# Patient Record
Sex: MALE | Race: WHITE | NOT HISPANIC OR LATINO | Employment: FULL TIME | URBAN - METROPOLITAN AREA
[De-identification: names, ages, dates, MRNs, and addresses within clinical notes are randomized per-mention and may not be internally consistent; named-entity substitution may affect disease eponyms.]

---

## 2017-03-13 ENCOUNTER — OFFICE VISIT (OUTPATIENT)
Dept: URGENT CARE | Facility: PHYSICIAN GROUP | Age: 42
End: 2017-03-13
Payer: COMMERCIAL

## 2017-03-13 VITALS
TEMPERATURE: 98 F | HEIGHT: 70 IN | SYSTOLIC BLOOD PRESSURE: 122 MMHG | WEIGHT: 205 LBS | OXYGEN SATURATION: 98 % | HEART RATE: 92 BPM | DIASTOLIC BLOOD PRESSURE: 84 MMHG | BODY MASS INDEX: 29.35 KG/M2

## 2017-03-13 DIAGNOSIS — G44.009 CLUSTER HEADACHE, NOT INTRACTABLE, UNSPECIFIED CHRONICITY PATTERN: ICD-10-CM

## 2017-03-13 PROCEDURE — 99203 OFFICE O/P NEW LOW 30 MIN: CPT | Performed by: NURSE PRACTITIONER

## 2017-03-13 RX ORDER — CEFDINIR 300 MG/1
CAPSULE ORAL
Refills: 0 | COMMUNITY
Start: 2017-02-28 | End: 2019-12-13

## 2017-03-13 RX ORDER — HYDROCODONE BITARTRATE AND ACETAMINOPHEN 5; 325 MG/1; MG/1
TABLET ORAL
Refills: 0 | COMMUNITY
Start: 2017-03-10 | End: 2019-12-13

## 2017-03-13 RX ORDER — ZOLMITRIPTAN 5 MG/1
1 SPRAY NASAL
Qty: 1 EACH | Refills: 3 | Status: SHIPPED | OUTPATIENT
Start: 2017-03-13 | End: 2019-12-13

## 2017-03-13 RX ORDER — CEFUROXIME AXETIL 500 MG/1
TABLET ORAL
Refills: 0 | COMMUNITY
Start: 2017-03-09 | End: 2019-12-13

## 2017-03-13 ASSESSMENT — ENCOUNTER SYMPTOMS
FEVER: 0
HEADACHES: 1
CHILLS: 0
VOMITING: 0
MYALGIAS: 0
SHORTNESS OF BREATH: 0
NAUSEA: 0

## 2017-03-13 NOTE — PATIENT INSTRUCTIONS
"Cluster Headache  Cluster headaches are recognized by their pattern of deep, intense head pain. They normally occur on one side of your head, but they may \"switch sides\" in subsequent episodes. Typically, cluster headaches:   · Are severe in nature.    · Occur repeatedly over weeks to months and are followed by periods of no headaches.    · Can last from 15 minutes to 3 hours.    · Occur at the same time each day, often at night.    · Occur several times a day.  CAUSES  The exact cause of cluster headaches is not known. Alcohol use may be associated with cluster headaches.  SIGNS AND SYMPTOMS   · Severe pain that begins in or around your eye or temple.    · One-sided head pain.    · Feeling sick to your stomach (nauseous).    · Sensitivity to light.    · Runny nose.    · Eye redness, tearing, and nasal stuffiness on the side of your head where you are experiencing pain.    · Sweaty, pale skin of the face.    · Droopy or swollen eyelid.    · Restlessness.  DIAGNOSIS   Cluster headaches are diagnosed based on symptoms and a physical exam. Your health care provider may order a CT scan or an MRI of your head or lab tests to see if your headaches are caused by other medical conditions.   TREATMENT   · Medicines for pain relief and to prevent recurrent attacks. Some people may need a combination of medicines.  · Oxygen for pain relief.    · Biofeedback programs to help reduce headache pain.    It may be helpful to keep a headache diary. This may help you find a trend for what is triggering your headaches. Your health care provider can develop a treatment plan.   HOME CARE INSTRUCTIONS   During cluster periods:   · Follow a regular sleep schedule. Do not vary the amount and time that you sleep from day to day. It is important to stay on the same schedule during a cluster period to help prevent headaches.    · Avoid alcohol.    · Stop smoking if you smoke.    SEEK MEDICAL CARE IF:  · You have any changes from your previous " cluster headaches either in intensity or frequency.    · You are not getting relief from medicines you are taking.    SEEK IMMEDIATE MEDICAL CARE IF:   · You faint.    · You have weakness or numbness, especially on one side of your body or face.    · You have double vision.    · You have nausea or vomiting that is not relieved within several hours.    · You cannot keep your balance or have difficulty talking or walking.    · You have neck pain or stiffness.    · You have a fever.  MAKE SURE YOU:  · Understand these instructions.    · Will watch your condition.    · Will get help right away if you are not doing well or get worse.     This information is not intended to replace advice given to you by your health care provider. Make sure you discuss any questions you have with your health care provider.     Document Released: 12/18/2006 Document Revised: 10/08/2014 Document Reviewed: 07/10/2014  [employer] Interactive Patient Education ©2016 [employer]

## 2017-03-13 NOTE — PROGRESS NOTES
"Subjective:      Kedar Michele is a 41 y.o. male who presents with Migraine  Surgical HX reviewed in [facility] and not pertinent to today's visit  Past Medical History   Diagnosis Date   • Brain hemangioma (CMS-HCC)      Current medications reviewed.  Social History   Substance Use Topics   • Smoking status: Never Smoker    • Smokeless tobacco: Not on file   • Alcohol Use: Not on file               HPI  Chief Complaint   Patient presents with   • Migraine     migraine x 2 months usually at night or morning   In December he was boxing and felt like he has had head aches since. Left sided in nature and throbbing and feels like his eye squeezing. Seems worse at night. He had to call in sick this am. Tried advil, excedrin, iburprofen, norco, cefdinr, sumatriptan, and fiorcet/fiornal. States the pain is worsening. This am he did have some blurry vision which was new. The pain is sudden and then resolves just as suddenly. No other aggravating or alleviating factors.     Review of Systems   Constitutional: Negative for fever and chills.   Respiratory: Negative for shortness of breath.    Gastrointestinal: Negative for nausea and vomiting.   Musculoskeletal: Negative for myalgias.   Neurological: Positive for headaches.   All other systems reviewed and are negative.         Objective:     /84 mmHg  Pulse 92  Temp(Src) 36.7 °C (98 °F)  Ht 1.778 m (5' 10\")  Wt 92.987 kg (205 lb)  BMI 29.41 kg/m2  SpO2 98%     Physical Exam   Constitutional: He is oriented to person, place, and time. He appears well-developed and well-nourished. No distress.   HENT:   Head: Normocephalic and atraumatic.   Right Ear: External ear normal.   Left Ear: External ear normal.   Eyes: Conjunctivae are normal.   Neck: Normal range of motion.   Pulmonary/Chest: Effort normal. No respiratory distress.   Neurological: He is alert and oriented to person, place, and time.   Skin: Skin is warm and dry.   Psychiatric: He has a normal mood and " affect. His behavior is normal. Judgment and thought content normal.     **Addendum** - zomig is too expensive - changed to sumatriptan intranasally           Assessment/Plan:     1. Cluster headache, not intractable, unspecified chronicity pattern  zolmitriptan (ZOMIG) 5 MG nasal solution     Keeping MRI appt for next week  Discussed causes  Will trial Zomig nasally  Fluids  Strict ER precautions discussed  Handout given    Please make an appointment for follow up with your primary care provider. Return for any change in condition, worsening of symptoms, or any other concerns. Please go to the nearest emergency room for any shortness of breath or chest pain or for any of the emergent conditions we have discussed. Patient states understanding to plan of care and discharge instructions.    Please note that this dictation was created using voice recognition software. I have worked with consultants from the vendor as well as technical experts from [facility] [employer] to optimize the interface. I have made every reasonable attempt to correct obvious errors, but I expect that there are errors of grammar and possibly content that I did not discover before finalizing the note.

## 2017-03-16 ENCOUNTER — PATIENT MESSAGE (OUTPATIENT)
Dept: URGENT CARE | Facility: PHYSICIAN GROUP | Age: 42
End: 2017-03-16

## 2017-03-16 ENCOUNTER — TELEPHONE (OUTPATIENT)
Dept: HOSPITALIST | Facility: MEDICAL CENTER | Age: 42
End: 2017-03-16

## 2017-03-16 NOTE — PROGRESS NOTES
I would be happy to supply a note for work with regards to tactical training until you follow up with your primary care provider. The note will be in the letters section of your chart.   However, I am unable to prescribed pain medication after our visit is complete as I am not a primary care provider. I would recommend calling Dr. Pineda since he has been treating you for this for the past few months.    Respectfully,   Lissa ARREDONDO

## 2017-03-16 NOTE — Clinical Note
March 16, 2017         Patient: Kedar Michele   YOB: 1975   Date of Visit: 3/16/2017           To Whom it May Concern:    Kedar Michele was seen in my clinic on 3/14/2017. 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    If you have any questions or concerns, please don't hesitate to call.        Sincerely,           JEROME Salgado.P.VIK.  Electronically Signed

## 2017-03-16 NOTE — TELEPHONE ENCOUNTER
Lissa Patterson, Pt's wife called this morning at appx 1150am to inquire about stronger pain meds for headaches and a note to excuse from Defensive Tactics class. She asked to have you return call at [phone]. Thank you!    - Mk

## 2017-03-20 ENCOUNTER — HOSPITAL ENCOUNTER (OUTPATIENT)
Dept: RADIOLOGY | Facility: MEDICAL CENTER | Age: 42
End: 2017-03-20
Attending: FAMILY MEDICINE
Payer: COMMERCIAL

## 2017-03-20 DIAGNOSIS — D18.02 BRAIN HEMANGIOMA (HCC): ICD-10-CM

## 2017-03-20 PROCEDURE — 70553 MRI BRAIN STEM W/O & W/DYE: CPT

## 2018-01-31 ENCOUNTER — HOSPITAL ENCOUNTER (OUTPATIENT)
Dept: RADIOLOGY | Facility: MEDICAL CENTER | Age: 43
End: 2018-01-31
Attending: PHYSICIAN ASSISTANT
Payer: COMMERCIAL

## 2018-01-31 DIAGNOSIS — M25.512 PAIN IN JOINT OF LEFT SHOULDER: ICD-10-CM

## 2018-01-31 PROCEDURE — 73030 X-RAY EXAM OF SHOULDER: CPT | Mod: LT

## 2018-04-21 ENCOUNTER — HOSPITAL ENCOUNTER (OUTPATIENT)
Dept: LAB | Facility: MEDICAL CENTER | Age: 43
End: 2018-04-21
Attending: PHYSICIAN ASSISTANT
Payer: COMMERCIAL

## 2018-04-21 LAB
25(OH)D3 SERPL-MCNC: 20 NG/ML (ref 30–100)
ALBUMIN SERPL BCP-MCNC: 5.1 G/DL (ref 3.2–4.9)
ALBUMIN/GLOB SERPL: 2 G/DL
ALP SERPL-CCNC: 99 U/L (ref 30–99)
ALT SERPL-CCNC: 22 U/L (ref 2–50)
ANION GAP SERPL CALC-SCNC: 10 MMOL/L (ref 0–11.9)
APPEARANCE UR: CLEAR
AST SERPL-CCNC: 20 U/L (ref 12–45)
BASOPHILS # BLD AUTO: 0.3 % (ref 0–1.8)
BASOPHILS # BLD: 0.03 K/UL (ref 0–0.12)
BILIRUB SERPL-MCNC: 1.1 MG/DL (ref 0.1–1.5)
BILIRUB UR QL STRIP.AUTO: NEGATIVE
BUN SERPL-MCNC: 18 MG/DL (ref 8–22)
CALCIUM SERPL-MCNC: 10.1 MG/DL (ref 8.5–10.5)
CHLORIDE SERPL-SCNC: 105 MMOL/L (ref 96–112)
CHOLEST SERPL-MCNC: 231 MG/DL (ref 100–199)
CO2 SERPL-SCNC: 24 MMOL/L (ref 20–33)
COLOR UR: YELLOW
CREAT SERPL-MCNC: 1.06 MG/DL (ref 0.5–1.4)
CRP SERPL HS-MCNC: 2.9 MG/L (ref 0–7.5)
EOSINOPHIL # BLD AUTO: 0.06 K/UL (ref 0–0.51)
EOSINOPHIL NFR BLD: 0.6 % (ref 0–6.9)
ERYTHROCYTE [DISTWIDTH] IN BLOOD BY AUTOMATED COUNT: 40.3 FL (ref 35.9–50)
GLOBULIN SER CALC-MCNC: 2.5 G/DL (ref 1.9–3.5)
GLUCOSE SERPL-MCNC: 91 MG/DL (ref 65–99)
GLUCOSE UR STRIP.AUTO-MCNC: NEGATIVE MG/DL
HCT VFR BLD AUTO: 48.9 % (ref 42–52)
HDLC SERPL-MCNC: 56 MG/DL
HGB BLD-MCNC: 17.2 G/DL (ref 14–18)
IMM GRANULOCYTES # BLD AUTO: 0.04 K/UL (ref 0–0.11)
IMM GRANULOCYTES NFR BLD AUTO: 0.4 % (ref 0–0.9)
KETONES UR STRIP.AUTO-MCNC: NEGATIVE MG/DL
LDLC SERPL CALC-MCNC: 162 MG/DL
LEUKOCYTE ESTERASE UR QL STRIP.AUTO: NEGATIVE
LYMPHOCYTES # BLD AUTO: 2.57 K/UL (ref 1–4.8)
LYMPHOCYTES NFR BLD: 27.2 % (ref 22–41)
MCH RBC QN AUTO: 29.9 PG (ref 27–33)
MCHC RBC AUTO-ENTMCNC: 35.2 G/DL (ref 33.7–35.3)
MCV RBC AUTO: 85 FL (ref 81.4–97.8)
MICRO URNS: NORMAL
MONOCYTES # BLD AUTO: 0.51 K/UL (ref 0–0.85)
MONOCYTES NFR BLD AUTO: 5.4 % (ref 0–13.4)
NEUTROPHILS # BLD AUTO: 6.25 K/UL (ref 1.82–7.42)
NEUTROPHILS NFR BLD: 66.1 % (ref 44–72)
NITRITE UR QL STRIP.AUTO: NEGATIVE
NRBC # BLD AUTO: 0 K/UL
NRBC BLD-RTO: 0 /100 WBC
PH UR STRIP.AUTO: 5.5 [PH]
PLATELET # BLD AUTO: 292 K/UL (ref 164–446)
PMV BLD AUTO: 10.2 FL (ref 9–12.9)
POTASSIUM SERPL-SCNC: 4.4 MMOL/L (ref 3.6–5.5)
PROT SERPL-MCNC: 7.6 G/DL (ref 6–8.2)
PROT UR QL STRIP: NEGATIVE MG/DL
PSA SERPL-MCNC: 0.74 NG/ML (ref 0–4)
RBC # BLD AUTO: 5.75 M/UL (ref 4.7–6.1)
RBC UR QL AUTO: NEGATIVE
SODIUM SERPL-SCNC: 139 MMOL/L (ref 135–145)
SP GR UR STRIP.AUTO: 1.03
T3FREE SERPL-MCNC: 3.32 PG/ML (ref 2.4–4.2)
T4 FREE SERPL-MCNC: 0.97 NG/DL (ref 0.53–1.43)
TRIGL SERPL-MCNC: 64 MG/DL (ref 0–149)
TSH SERPL DL<=0.005 MIU/L-ACNC: 0.99 UIU/ML (ref 0.38–5.33)
UROBILINOGEN UR STRIP.AUTO-MCNC: 0.2 MG/DL
WBC # BLD AUTO: 9.5 K/UL (ref 4.8–10.8)

## 2018-04-21 PROCEDURE — 80053 COMPREHEN METABOLIC PANEL: CPT

## 2018-04-21 PROCEDURE — 84153 ASSAY OF PSA TOTAL: CPT

## 2018-04-21 PROCEDURE — 85025 COMPLETE CBC W/AUTO DIFF WBC: CPT

## 2018-04-21 PROCEDURE — 86141 C-REACTIVE PROTEIN HS: CPT

## 2018-04-21 PROCEDURE — 82306 VITAMIN D 25 HYDROXY: CPT

## 2018-04-21 PROCEDURE — 84481 FREE ASSAY (FT-3): CPT

## 2018-04-21 PROCEDURE — 84439 ASSAY OF FREE THYROXINE: CPT

## 2018-04-21 PROCEDURE — 84270 ASSAY OF SEX HORMONE GLOBUL: CPT

## 2018-04-21 PROCEDURE — 81003 URINALYSIS AUTO W/O SCOPE: CPT

## 2018-04-21 PROCEDURE — 84443 ASSAY THYROID STIM HORMONE: CPT

## 2018-04-21 PROCEDURE — 36415 COLL VENOUS BLD VENIPUNCTURE: CPT

## 2018-04-21 PROCEDURE — 84403 ASSAY OF TOTAL TESTOSTERONE: CPT

## 2018-04-21 PROCEDURE — 80061 LIPID PANEL: CPT

## 2018-04-24 LAB
SHBG SERPL-SCNC: 45 NMOL/L (ref 11–80)
TESTOST FREE MFR SERPL: 1.5 % (ref 1.6–2.9)
TESTOST FREE SERPL-MCNC: 44 PG/ML (ref 47–244)
TESTOST SERPL-MCNC: 297 NG/DL (ref 300–890)

## 2019-10-08 ENCOUNTER — HOSPITAL ENCOUNTER (OUTPATIENT)
Dept: LAB | Facility: MEDICAL CENTER | Age: 44
End: 2019-10-08
Attending: PHYSICIAN ASSISTANT
Payer: COMMERCIAL

## 2019-10-08 LAB
25(OH)D3 SERPL-MCNC: 25 NG/ML (ref 30–100)
ALBUMIN SERPL BCP-MCNC: 5 G/DL (ref 3.2–4.9)
ALBUMIN/GLOB SERPL: 2.6 G/DL
ALP SERPL-CCNC: 68 U/L (ref 30–99)
ALT SERPL-CCNC: 18 U/L (ref 2–50)
ANION GAP SERPL CALC-SCNC: 8 MMOL/L (ref 0–11.9)
AST SERPL-CCNC: 16 U/L (ref 12–45)
BASOPHILS # BLD AUTO: 0.3 % (ref 0–1.8)
BASOPHILS # BLD: 0.02 K/UL (ref 0–0.12)
BILIRUB SERPL-MCNC: 1.2 MG/DL (ref 0.1–1.5)
BUN SERPL-MCNC: 19 MG/DL (ref 8–22)
CALCIUM SERPL-MCNC: 9.8 MG/DL (ref 8.5–10.5)
CHLORIDE SERPL-SCNC: 104 MMOL/L (ref 96–112)
CHOLEST SERPL-MCNC: 175 MG/DL (ref 100–199)
CO2 SERPL-SCNC: 26 MMOL/L (ref 20–33)
CREAT SERPL-MCNC: 0.98 MG/DL (ref 0.5–1.4)
EOSINOPHIL # BLD AUTO: 0.08 K/UL (ref 0–0.51)
EOSINOPHIL NFR BLD: 1.2 % (ref 0–6.9)
ERYTHROCYTE [DISTWIDTH] IN BLOOD BY AUTOMATED COUNT: 40 FL (ref 35.9–50)
EST. AVERAGE GLUCOSE BLD GHB EST-MCNC: 94 MG/DL
FASTING STATUS PATIENT QL REPORTED: NORMAL
GLOBULIN SER CALC-MCNC: 1.9 G/DL (ref 1.9–3.5)
GLUCOSE SERPL-MCNC: 79 MG/DL (ref 65–99)
HBA1C MFR BLD: 4.9 % (ref 0–5.6)
HCT VFR BLD AUTO: 49 % (ref 42–52)
HDLC SERPL-MCNC: 50 MG/DL
HGB BLD-MCNC: 16.6 G/DL (ref 14–18)
IMM GRANULOCYTES # BLD AUTO: 0.02 K/UL (ref 0–0.11)
IMM GRANULOCYTES NFR BLD AUTO: 0.3 % (ref 0–0.9)
LDLC SERPL CALC-MCNC: 106 MG/DL
LYMPHOCYTES # BLD AUTO: 2.49 K/UL (ref 1–4.8)
LYMPHOCYTES NFR BLD: 36.1 % (ref 22–41)
MCH RBC QN AUTO: 29.1 PG (ref 27–33)
MCHC RBC AUTO-ENTMCNC: 33.9 G/DL (ref 33.7–35.3)
MCV RBC AUTO: 85.8 FL (ref 81.4–97.8)
MONOCYTES # BLD AUTO: 0.49 K/UL (ref 0–0.85)
MONOCYTES NFR BLD AUTO: 7.1 % (ref 0–13.4)
NEUTROPHILS # BLD AUTO: 3.8 K/UL (ref 1.82–7.42)
NEUTROPHILS NFR BLD: 55 % (ref 44–72)
NRBC # BLD AUTO: 0 K/UL
NRBC BLD-RTO: 0 /100 WBC
PLATELET # BLD AUTO: 266 K/UL (ref 164–446)
PMV BLD AUTO: 10.6 FL (ref 9–12.9)
POTASSIUM SERPL-SCNC: 4.1 MMOL/L (ref 3.6–5.5)
PROT SERPL-MCNC: 6.9 G/DL (ref 6–8.2)
PSA SERPL-MCNC: 0.82 NG/ML (ref 0–4)
RBC # BLD AUTO: 5.71 M/UL (ref 4.7–6.1)
SODIUM SERPL-SCNC: 138 MMOL/L (ref 135–145)
T3FREE SERPL-MCNC: 3.73 PG/ML (ref 2.4–4.2)
T4 FREE SERPL-MCNC: 1.05 NG/DL (ref 0.53–1.43)
TRIGL SERPL-MCNC: 96 MG/DL (ref 0–149)
TSH SERPL DL<=0.005 MIU/L-ACNC: 0.81 UIU/ML (ref 0.38–5.33)
WBC # BLD AUTO: 6.9 K/UL (ref 4.8–10.8)

## 2019-10-08 PROCEDURE — 80053 COMPREHEN METABOLIC PANEL: CPT

## 2019-10-08 PROCEDURE — 84403 ASSAY OF TOTAL TESTOSTERONE: CPT

## 2019-10-08 PROCEDURE — 83036 HEMOGLOBIN GLYCOSYLATED A1C: CPT

## 2019-10-08 PROCEDURE — 84439 ASSAY OF FREE THYROXINE: CPT

## 2019-10-08 PROCEDURE — 84153 ASSAY OF PSA TOTAL: CPT

## 2019-10-08 PROCEDURE — 84443 ASSAY THYROID STIM HORMONE: CPT

## 2019-10-08 PROCEDURE — 80061 LIPID PANEL: CPT

## 2019-10-08 PROCEDURE — 84270 ASSAY OF SEX HORMONE GLOBUL: CPT

## 2019-10-08 PROCEDURE — 82306 VITAMIN D 25 HYDROXY: CPT

## 2019-10-08 PROCEDURE — 36415 COLL VENOUS BLD VENIPUNCTURE: CPT

## 2019-10-08 PROCEDURE — 84481 FREE ASSAY (FT-3): CPT

## 2019-10-08 PROCEDURE — 85025 COMPLETE CBC W/AUTO DIFF WBC: CPT

## 2019-10-09 LAB
SHBG SERPL-SCNC: 43 NMOL/L (ref 11–80)
TESTOST FREE MFR SERPL: 1.6 % (ref 1.6–2.9)
TESTOST FREE SERPL-MCNC: 85 PG/ML (ref 47–244)
TESTOST SERPL-MCNC: 519 NG/DL (ref 300–890)

## 2019-10-11 ENCOUNTER — HOSPITAL ENCOUNTER (OUTPATIENT)
Dept: RADIOLOGY | Facility: MEDICAL CENTER | Age: 44
End: 2019-10-11
Attending: PHYSICIAN ASSISTANT
Payer: COMMERCIAL

## 2019-10-11 DIAGNOSIS — G89.29 CHRONIC LOW BACK PAIN WITHOUT SCIATICA, UNSPECIFIED BACK PAIN LATERALITY: ICD-10-CM

## 2019-10-11 DIAGNOSIS — M54.50 CHRONIC LOW BACK PAIN WITHOUT SCIATICA, UNSPECIFIED BACK PAIN LATERALITY: ICD-10-CM

## 2019-10-11 PROCEDURE — 72072 X-RAY EXAM THORAC SPINE 3VWS: CPT

## 2019-12-12 ENCOUNTER — TELEPHONE (OUTPATIENT)
Dept: SCHEDULING | Facility: IMAGING CENTER | Age: 44
End: 2019-12-12

## 2019-12-13 ENCOUNTER — OFFICE VISIT (OUTPATIENT)
Dept: MEDICAL GROUP | Facility: PHYSICIAN GROUP | Age: 44
End: 2019-12-13
Payer: COMMERCIAL

## 2019-12-13 VITALS
RESPIRATION RATE: 16 BRPM | WEIGHT: 190 LBS | TEMPERATURE: 97.3 F | HEIGHT: 70 IN | OXYGEN SATURATION: 97 % | HEART RATE: 70 BPM | BODY MASS INDEX: 27.2 KG/M2 | DIASTOLIC BLOOD PRESSURE: 70 MMHG | SYSTOLIC BLOOD PRESSURE: 120 MMHG

## 2019-12-13 DIAGNOSIS — M54.50 ACUTE RIGHT-SIDED LOW BACK PAIN WITHOUT SCIATICA: ICD-10-CM

## 2019-12-13 PROBLEM — M54.9 BACK PAIN: Status: ACTIVE | Noted: 2019-12-13

## 2019-12-13 PROCEDURE — 99204 OFFICE O/P NEW MOD 45 MIN: CPT | Performed by: FAMILY MEDICINE

## 2019-12-13 NOTE — PROGRESS NOTES
Subjective:     CC:  The encounter diagnosis was Acute right-sided low back pain without sciatica.    HISTORY OF THE PRESENT ILLNESS: Patient is a 44 y.o. male. This pleasant patient is here today to establish care and discuss the following problems.   Prior PCP: None.    Back pain  This is a new problem.  Onset: Started developing right thoracolumbar back pain 6 months ago  Location: Right paraspinal pain  Duration: On/off  Quality: Described as dull   Trauma: Denies any  Modifying factors: He mentions that recently he has spent more time sitting down at the office which might have brought on his back pain.   Treatments: He has tried OTC medicines including Aleeve as needed.   Associated symptoms: Denies any incontinence, leg numbness or weakness.       Allergies: Pcn [penicillins]    No current Epic-ordered outpatient medications on file.     No current [facility]-ordered facility-administered medications on file.        Past Medical History:   Diagnosis Date   • Brain hemangioma (HCC)        History reviewed. No pertinent surgical history.    Social History     Tobacco Use   • Smoking status: Never Smoker   • Smokeless tobacco: Never Used   Substance Use Topics   • Alcohol use: Not Currently   • Drug use: Never       Social History     Patient does not qualify to have social determinant information on file (likely too young).   Social History Narrative   • Not on file       Family History   Problem Relation Age of Onset   • Mult Sclerosis Mother        Health Maintenance: Completed    ROS:   Gen: no fevers/chills  Eyes: no changes in vision  ENT: no sore throat  Pulm: no sob, no cough  CV: no chest pain, no palpitations  GI: no nausea/vomiting, no diarrhea  : no dysuria  MSk: no myalgias  Skin: no rash  Neuro: no headaches  Heme/Lymph: no easy bruising      Objective:     Exam: /70 (BP Location: Left arm, Patient Position: Sitting, BP Cuff Size: Adult)   Pulse 70   Temp 36.3 °C (97.3 °F) (Temporal)   Resp 16  "  Ht 1.778 m (5' 10\")   Wt 86.2 kg (190 lb)   SpO2 97%  Body mass index is 27.26 kg/m².    General: Normal appearing. No distress.  HENT: Normocephalic. Ears normal shape and contour, canals are clear bilaterally, tympanic membranes are benign, nasal mucosa benign, oropharynx is without erythema, edema or exudates.   Eyes: Conjunctiva clear lids without ptosis, pupils equal and reactive to light accommodation.  Neck: Supple without JVD. Thyroid is not enlarged.  Pulmonary: Clear to ausculation.  Normal effort. No rales, ronchi, or wheezing.  Cardiovascular: Regular rate and rhythm without murmur. Radial pulses are intact and equal bilaterally.  Abdomen: Soft, nontender, nondistended. Normal bowel sounds. Liver and spleen are not palpable  Neurologic: Moves all extremities  Lymph: No cervical or supraclavicular lymph nodes are palpable  Skin: Warm and dry.  No obvious lesions.  Musculoskeletal: Normal gait. No extremity cyanosis, clubbing, or edema.  Back range of motion is intact.  Psych: Normal mood and affect. Alert and oriented x3. Judgment and insight is normal.    Assessment & Plan:   44 y.o. male with the following -    1. Acute right-sided low back pain without sciatica  This is a new problem. The patient presents with new onset right thoracolumbar pain of 6 months duration. Specifically, he is c/o right paraspinal back pain with no apparent history of trauma. Denies symptoms suggestive of Cauda Equina syndrome. Given that his pain has persisted this long I recommend management with a short course of flexeril along with persistent use of meloxicam to relieve pain and inflammation. I’ve also placed a referral for physical therapy. Plan for imaging if pain persists for greater than 6 months. The patient is agreeable to plan.   -Meloxicam 7.5mg qday  -Flexeril 7.5mg for 10 days     Return in about 3 months (around 3/13/2020) for Fu back pain.    Please note that this dictation was created using voice " recognition software. I have made every reasonable attempt to correct obvious errors, but I expect that there are errors of grammar and possibly content that I did not discover before finalizing the note.

## 2019-12-13 NOTE — ASSESSMENT & PLAN NOTE
This is a new problem.  Onset: Started developing right thoracolumbar back pain 6 months ago  Location: Right paraspinal pain  Duration: On/off  Quality: Described as dull   Trauma: Denies any  Modifying factors: He mentions that recently he has spent more time sitting down at the office which might have brought on his back pain.   Treatments: He has tried OTC medicines including Aleeve as needed.   Associated symptoms: Denies any incontinence, leg numbness or weakness.

## 2019-12-27 ENCOUNTER — PHYSICAL THERAPY (OUTPATIENT)
Dept: PHYSICAL THERAPY | Facility: REHABILITATION | Age: 44
End: 2019-12-27
Attending: FAMILY MEDICINE
Payer: COMMERCIAL

## 2019-12-27 DIAGNOSIS — M54.50 ACUTE RIGHT-SIDED LOW BACK PAIN WITHOUT SCIATICA: ICD-10-CM

## 2019-12-27 PROCEDURE — 97110 THERAPEUTIC EXERCISES: CPT

## 2019-12-27 PROCEDURE — 97162 PT EVAL MOD COMPLEX 30 MIN: CPT

## 2019-12-27 SDOH — ECONOMIC STABILITY: GENERAL: QUALITY OF LIFE: EXCELLENT

## 2019-12-27 ASSESSMENT — ENCOUNTER SYMPTOMS
ALLEVIATING FACTORS: PAIN MEDICATION
ALLEVIATING FACTORS: HEATING PAD
EXACERBATED BY: LIFTING
PAIN TIMING: INTERMITTENT
QUALITY: DULL ACHE
ALLEVIATING FACTORS: POSITION CHANGE
PAIN SCALE: 0
EXACERBATED BY: TYPING
PAIN TIMING: IN THE AFTERNOON
PAIN SCALE AT LOWEST: 0
PAIN SCALE AT HIGHEST: 7
ALLEVIATING FACTORS: CHANGE IN POSITION
EXACERBATED BY: PROLONGED SITTING

## 2019-12-27 NOTE — OP THERAPY EVALUATION
Outpatient Physical Therapy  INITIAL EVALUATION    [facility] [place] [id number], [address]  [place] [address]  Phone:  [phone]    Date of Evaluation: 12/27/2019    Patient: Kedar Michele III  YOB: 1975  MRN: [id number]     Referring Provider: Dat Solorio M.D.  [address]  [place] [address]   Referring Diagnosis Acute right-sided low back pain without sciatica [M54.5]     Time Calculation                   Chief Complaint: No chief complaint on file.    Visit Diagnoses     ICD-10-CM   1. Acute right-sided low back pain without sciatica M54.5         Subjective   History of Present Illness:     History of chief complaint:  The patient is a 44 year old male        Past Medical History:   Diagnosis Date   • Brain hemangioma (HCC)      No past surgical history on file.    Precautions:       Objective    Exercises/Treatment  Time-based treatments/modalities:          Assessment/Response/Plan    Functional Assessment Used        Referring provider co-signature:  I have reviewed this plan of care and my co-signature certifies the need for services.  Certification Dates:   From 12/27/19     To {Future Date:24418}    Physician Signature: ________________________________ Date: ______________

## 2019-12-27 NOTE — OP THERAPY EVALUATION
"  Outpatient Physical Therapy  INITIAL EVALUATION    [facility] [place]  [address], [address]  [place] [address]  Phone:  [phone]    Date of Evaluation: 2019    Patient: Kedar Michele III  YOB: 1975  MRN: [id number]     Referring Provider: Dat Solorio M.D.  [address]  [address]  [place] [address]   Referring Diagnosis Acute right-sided low back pain without sciatica [M54.5]     Time Calculation               Physical Therapy Occurrence Codes    Date of onset of impairment:  19   Date physical therapy care plan established or reviewed:  19   Date physical therapy treatment started:  19          Chief Complaint: Back Problem    Visit Diagnoses     ICD-10-CM   1. Acute right-sided low back pain without sciatica M54.5         Subjective:   History of Present Illness:     Date of onset:  2019    Mechanism of injury:  The patient \"Bradly\" is a 44 year old male who reports having mid to low back pain for about ~6 months. He changed positions at his job and is sitting more often but still continues to stand and use a versa-desk. He notes intermittent pain to his mid-to low back pain with activity like cutting vegetables while standing. Reaching out to lift objects irritates his low back with light weight. He is taking Flexeril and anti-inflammatories to curb his pain level. 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 He has experienced no known trauma or injury to his back and would like to alleviate this problem to be able to get back to more functional activity.  Quality of life:  Excellent  Sleep disturbance:  Not disrupted  Pain:     Current pain ratin    At best pain ratin    At worst pain ratin    Quality:  Dull ache    Pain timing:  In the afternoon and intermittent    " Relieving factors:  Change in position, heating pad, pain medication and position change    Aggravating factors:  Lifting, prolonged sitting and typing    Progression:  Unchanged    Pain Comments::  Standing on a ladder at home and working with lights in front of his body increased his mid to low back pain  Treatments:     Current treatment:  Activity modification and heat  Patient Goals:     Patient goals for therapy:  Decreased pain, independence with ADLs/IADLs, increased strength and increased motion    Other patient goals:  To be able to increas ehis mobility and decres his pain level      Past Medical History:   Diagnosis Date   • Brain hemangioma (HCC)      No past surgical history on file.  Social History     Tobacco Use   • Smoking status: Never Smoker   • Smokeless tobacco: Never Used   Substance Use Topics   • Alcohol use: Not Currently     Family and Occupational History     Patient does not qualify to have social determinant information on file (likely too young).   Socioeconomic History   • Marital status:      Spouse name: Not on file   • Number of children: Not on file   • Years of education: Not on file   • Highest education level: Not on file   Occupational History   • Not on file       Objective     Postural Observations  Seated posture: fair  Standing posture: fair  Correction of posture: makes symptoms better    Additional Postural Observation Details  Sitting in chair with increased trunk flexion; patient reports typing in various positions and rotating to the (R) side to enter data and then returns to neutral positions but has been doing this for about 1-2 years.    Palpation   Left   Hypertonic in the lumbar paraspinals.     Right   Hypertonic in the lumbar paraspinals.     Additional Palpation Details  Palpation to the (R) latissimus and paraspinals indicated tightness and hypertonicity to the connective tissue adjacent along the spine.     Active Range of Motion     Lumbar   Flexion:  "decreased  Extension: within functional limits  Left lateral flexion: within functional limits  Right lateral flexion: within functional limits  Left rotation: within functional limits  Right rotation: within functional limits    Additional Active Range of Motion Details  Appearance of slight \"kyphotic\" like posture in the upper to mid thoracic region     Tests       Lumbar spine (left)      Negative slump.   Lumbar spine (right)     Negative slump.     Left Hip   Negative SI compression and SI distraction.   SLR: Negative.     Right Hip   Negative SI compression and SI distraction.   SLR: Negative.         Therapeutic Exercises (CPT 43478):     1. Bridges, 1 x10 reps swith 5-10 sec hold    2. Sidelying (L) trunk rotation , 3 x30 sec    3. Prone press ups    4. Shoulder retractions     Therapeutic Treatments and Modalities:     1. Manual Therapy (CPT 68366), thoracic, mobilizations    Time-based treatments/modalities:          Assessment, Response and Plan:   Impairments: abnormal ADL function, abnormal muscle tone, activity intolerance, impaired functional mobility, impaired physical strength, lacks appropriate home exercise program, limited mobility and pain with function    Assessment details:  The patient is a 44 year old male who reports mid to low back pain. He describes sitting and standing prolonged with frequent activity rotating at his trunk to the (R) and then returning back to a more neutral position in front of his computer. Posture has slight kyphotic like position. He has increased muscle tone to the lumbar and thoracic paraspinals > (R) side.  He has decreased trunk extension during AROM and appears to have bilateral hamstring tightness upon special testing with no indications of radicular symptoms upon testing. Patient would benefit from skilled Physical Therapy and to work on postural awareness/ modification and education, strength and conditioning, AROM activity intolerance activity.    Goals: "   Short Term Goals:   1) Indep HEP   2) Frequent changes in position throughout the day at 10-20 times   3) Able to hold objects in front of his torso 50% of the time or more   Short term goal time span:  2-4 weeks      Long Term Goals:    1) Progression/regression with advancing HEP  2) Able to tolerate sitting or standing positions at work 50% of the time or more with less pain by 1-2 levels on VAS grading scale   3) Completes projects requiring lifting and holding objects in front of his body   Long term goal time span:  4-6 weeks    Plan:   Therapy options:  Physical therapy treatment to continue  Planned therapy interventions:  E Stim Unattended (CPT 18849), Functional Training, Self Care (CPT 31223), Manual Therapy (CPT 98885), Neuromuscular Re-education (CPT 21014), Self Care ADL Training (CPT 03357), Therapeutic Activities (CPT 39742) and Therapeutic Exercise (CPT 66395)  Frequency:  2x week  Duration in weeks:  6  Duration in visits:  12  Discussed with:  Patient      Functional Assessment Used        Referring provider co-signature:  I have reviewed this plan of care and my co-signature certifies the need for services.  Certification Dates:   From 12/27/19   To 02/11/20    Physician Signature: ________________________________ Date: ______________

## 2020-01-02 ENCOUNTER — PHYSICAL THERAPY (OUTPATIENT)
Dept: PHYSICAL THERAPY | Facility: REHABILITATION | Age: 45
End: 2020-01-02
Attending: FAMILY MEDICINE
Payer: COMMERCIAL

## 2020-01-02 DIAGNOSIS — M54.50 ACUTE RIGHT-SIDED LOW BACK PAIN WITHOUT SCIATICA: ICD-10-CM

## 2020-01-02 PROCEDURE — 97140 MANUAL THERAPY 1/> REGIONS: CPT

## 2020-01-02 PROCEDURE — 97110 THERAPEUTIC EXERCISES: CPT

## 2020-01-02 NOTE — OP THERAPY DAILY TREATMENT
Outpatient Physical Therapy  DAILY TREATMENT     [facility] [address] [facility], [address]  [place] [address]  Phone:  [phone]    Date: 01/02/2020    Patient: Kedar Michele III  YOB: 1975  MRN: [id number]     Time Calculation  Start time: 1130  Stop time: 1200 Time Calculation (min): 30 minutes       Chief Complaint: No chief complaint on file.    Visit #: 2    SUBJECTIVE:  The patient reports having some aching pain to the (R) side of the lower back but no more than usual    OBJECTIVE:  Current objective measures:       AROM to trunk rotation (L)     Therapeutic Exercises (CPT 69410):     1. Bridges, 1 x10 reps swith 5-10 sec hold    2. Sidelying (L) trunk rotation , 5 x30 sec    3. Prone press ups    4. Shoulder retractions     5. Ab crunches with pelvic tilts , 1 x10 reps     6. Child's pose stretch , (lateral stretch to the (R) and (L) 30 sec hold    Therapeutic Treatments and Modalities:     1. Manual Therapy (CPT 84362), thoracic, mobilizations grade 2-3 P/A's T7-T12, L2-L5    Time-based treatments/modalities:  Manual therapy minutes (CPT 29250): 8 minutes  Therapeutic exercise minutes (CPT 98707): 22 minutes           ASSESSMENT:   Response to treatment:   AROM in trunk rotation to the (L) side ~25% better today with less pain and discomfort    PLAN/RECOMMENDATIONS:   Plan for treatment: therapy treatment to continue next visit.  Planned interventions for next visit: continue with current treatment.

## 2020-01-07 ENCOUNTER — APPOINTMENT (OUTPATIENT)
Dept: PHYSICAL THERAPY | Facility: REHABILITATION | Age: 45
End: 2020-01-07
Attending: FAMILY MEDICINE
Payer: COMMERCIAL

## 2020-01-14 ENCOUNTER — PHYSICAL THERAPY (OUTPATIENT)
Dept: PHYSICAL THERAPY | Facility: REHABILITATION | Age: 45
End: 2020-01-14
Attending: FAMILY MEDICINE
Payer: COMMERCIAL

## 2020-01-14 DIAGNOSIS — M54.50 ACUTE RIGHT-SIDED LOW BACK PAIN WITHOUT SCIATICA: ICD-10-CM

## 2020-01-14 PROCEDURE — 97140 MANUAL THERAPY 1/> REGIONS: CPT

## 2020-01-14 PROCEDURE — 97110 THERAPEUTIC EXERCISES: CPT

## 2020-01-15 NOTE — OP THERAPY DAILY TREATMENT
Outpatient Physical Therapy  DAILY TREATMENT     [facility] [place] [address]  [place] [address]  Phone:  [phone]    Date: 01/14/2020    Patient: Kedar Michele III  YOB: 1975  MRN: [id number]     Time Calculation  Start time: 0500  Stop time: 0530 Time Calculation (min): 30 minutes       Chief Complaint: No chief complaint on file.    Visit #: 3    SUBJECTIVE:  Back is feeling good today, it used to hurt every day. Indicates TL junction.     OBJECTIVE:  Current objective measures:   DN flexion  DN extension  DN rotation  Jeff (+) R > L psoas and bilateral rectus and ITB            Therapeutic Exercises (CPT 30015):     1. Jeff stretch    2. FR quads     Therapeutic Treatments and Modalities:     1. Manual Therapy (CPT 55976), bilateral legs, STW quads and ITB     2. Functional Training, Self Care (CPT 65323), HEP w/ pics    Time-based treatments/modalities:  Manual therapy minutes (CPT 38727): 12 minutes  Therapeutic exercise minutes (CPT 08825): 18 minutes         ASSESSMENT:   Response to treatment: Patient making progress in improving thoracic extension and rotation mobility. He has poor mobility for hip extension with L > R psoas restriction and bilateral ITB and rectus tension. This is contributing to excessive thoracolumbar extension required with multisegmental extension movements (ie standing doing work with arms over head)    PLAN/RECOMMENDATIONS:   Plan for treatment: therapy treatment to continue next visit.  Planned interventions for next visit: continue with current treatment.

## 2020-01-16 ENCOUNTER — APPOINTMENT (OUTPATIENT)
Dept: PHYSICAL THERAPY | Facility: REHABILITATION | Age: 45
End: 2020-01-16
Attending: FAMILY MEDICINE
Payer: COMMERCIAL

## 2020-01-21 ENCOUNTER — APPOINTMENT (OUTPATIENT)
Dept: PHYSICAL THERAPY | Facility: REHABILITATION | Age: 45
End: 2020-01-21
Attending: FAMILY MEDICINE
Payer: COMMERCIAL

## 2020-01-22 ENCOUNTER — PHYSICAL THERAPY (OUTPATIENT)
Dept: PHYSICAL THERAPY | Facility: REHABILITATION | Age: 45
End: 2020-01-22
Attending: FAMILY MEDICINE
Payer: COMMERCIAL

## 2020-01-22 DIAGNOSIS — M54.50 ACUTE RIGHT-SIDED LOW BACK PAIN WITHOUT SCIATICA: ICD-10-CM

## 2020-01-22 PROCEDURE — 97140 MANUAL THERAPY 1/> REGIONS: CPT

## 2020-01-22 PROCEDURE — 97110 THERAPEUTIC EXERCISES: CPT

## 2020-01-22 NOTE — OP THERAPY DAILY TREATMENT
Outpatient Physical Therapy  DAILY TREATMENT     [facility] [place] [id number], [address]  [place] [address]  Phone:  [phone]    Date: 01/22/2020    Patient: Kedar Michele III  YOB: 1975  MRN: [id number]     Time Calculation               Chief Complaint: Back Problem    Visit #: 4    SUBJECTIVE:  The patient reports tightness to the middle back while driving over to therapy tonight. He feels sitting long durations affects his back more sometimes.    OBJECTIVE:  Current objective measures:     tenderness and tightness to the thoracic paraspinals       Therapeutic Exercises (CPT 16636):     1. Bridges/ unilateal , 1 x10 reps swith 5-10 sec hold, added 1 x 5 unilateral bridges     2. Sidelying (L) trunk rotation , 5 x30 sec    3. Prone press ups    4. Shoulder retractions     5. Ab crunches with pelvic tilts , 1 x10 reps     6. Child's pose stretch , (lateral stretch to the (R) and (L) 30 sec hold    7. Thoracic seated stretch, 3 x 30 seconds     Therapeutic Treatments and Modalities:     1. Manual Therapy (CPT 68078), thoracic, mobilizations grade 2-3 P/A's T7-T12, L2-L5    Time-based treatments/modalities:            ASSESSMENT:   Response to treatment:     STM at the thoracic paraspinals between T3-T8; increased tightness; implemented manual therapy techniques to stretch connective tissue; patient reported less tension; try using Hawk  tools next visit. Instruction on thoracic seated stretch for latissimus dorsi; Performed well with good relief.     PLAN/RECOMMENDATIONS:   Plan for treatment: therapy treatment to continue next visit.  Planned interventions for next visit: continue with current treatment.

## 2020-01-23 ENCOUNTER — PHYSICAL THERAPY (OUTPATIENT)
Dept: PHYSICAL THERAPY | Facility: REHABILITATION | Age: 45
End: 2020-01-23
Attending: FAMILY MEDICINE
Payer: COMMERCIAL

## 2020-01-23 DIAGNOSIS — M54.50 ACUTE RIGHT-SIDED LOW BACK PAIN WITHOUT SCIATICA: ICD-10-CM

## 2020-01-23 PROCEDURE — 97535 SELF CARE MNGMENT TRAINING: CPT

## 2020-01-23 PROCEDURE — 97110 THERAPEUTIC EXERCISES: CPT

## 2020-01-24 NOTE — OP THERAPY DAILY TREATMENT
Outpatient Physical Therapy  DAILY TREATMENT     [facility] Out[facility] Physical Therapy [place] [address], [address]  [place] [address]  Phone:  [phone]    Date: 01/23/2020    Patient: Kedar Michele III  YOB: 1975  MRN: [id number]     Time Calculation  Start time: 0500  Stop time: 0538 Time Calculation (min): 38 minutes       Chief Complaint: No chief complaint on file.    Visit #: 5    SUBJECTIVE:  Back has intermittent periods of soreness, comes on without known provocation, but typically noticed in latera afternoon. R. TL junction. I feel like I am close thought to 100%.     OBJECTIVE:  Current objective measures:     Jeff test: (+) L. Rectus  Jeff test: (+) R. ITB    MS Flexion: DN HS mobility  MS extension: DN lack of thoracic extension.           Therapeutic Exercises (CPT 32763):     1. Supine HS active pump    2. Supine HS static stretch    3. Supine ITB static stretch    4. FR thoracic extension     5. FR roll out of thoracic spine       Time-based treatments/modalities:  Therapeutic exercise minutes (CPT 65643): 23 minutes  Functional training, self care minutes (CPT 64418): 15 minutes         ASSESSMENT:   Response to treatment: Patient has made great progress in improving mobility in his hips and thorax and his symptoms are very low and could not be reproduced within a PT session.     PLAN/RECOMMENDATIONS:   Patient will try being independent with HEP and return if symptoms return or fail to fully resolve.

## 2020-08-17 ENCOUNTER — OFFICE VISIT (OUTPATIENT)
Dept: MEDICAL GROUP | Facility: PHYSICIAN GROUP | Age: 45
End: 2020-08-17
Payer: COMMERCIAL

## 2020-08-17 VITALS
HEART RATE: 68 BPM | SYSTOLIC BLOOD PRESSURE: 128 MMHG | WEIGHT: 207.6 LBS | BODY MASS INDEX: 29.72 KG/M2 | RESPIRATION RATE: 16 BRPM | TEMPERATURE: 98.4 F | HEIGHT: 70 IN | DIASTOLIC BLOOD PRESSURE: 90 MMHG | OXYGEN SATURATION: 97 %

## 2020-08-17 DIAGNOSIS — R22.1 LUMP IN NECK: ICD-10-CM

## 2020-08-17 DIAGNOSIS — Z00.00 WELL ADULT EXAM: ICD-10-CM

## 2020-08-17 PROCEDURE — 99213 OFFICE O/P EST LOW 20 MIN: CPT | Performed by: FAMILY MEDICINE

## 2020-08-17 ASSESSMENT — FIBROSIS 4 INDEX: FIB4 SCORE: 0.62

## 2020-08-17 ASSESSMENT — PATIENT HEALTH QUESTIONNAIRE - PHQ9: CLINICAL INTERPRETATION OF PHQ2 SCORE: 0

## 2020-08-17 NOTE — ASSESSMENT & PLAN NOTE
This is a chronic condition.    Symptom onset: The patient has had a posterior neck lump of approximately a year and a half duration  Current symptoms: Unchanged in size and does not bother him  Since onset symptoms are: Unchanged  Modifying factors: Denies any prior history of this  Treatments tried:  None.  Associated symptoms: Denies any.

## 2020-08-17 NOTE — PROGRESS NOTES
"Subjective:     Chief Complaint   Patient presents with   • Bump     back of neck        HPI:   Kedar presents today to discuss the following.  Prior PCP:     Lump in neck  This is a chronic condition.    Symptom onset: The patient has had a posterior neck lump of approximately a year and a half duration  Current symptoms: Unchanged in size and does not bother him  Since onset symptoms are: Unchanged  Modifying factors: Denies any prior history of this  Treatments tried:  None.  Associated symptoms: Denies any.         Past Medical History:   Diagnosis Date   • Brain hemangioma (HCC)        Social History     Tobacco Use   • Smoking status: Never Smoker   • Smokeless tobacco: Never Used   Substance Use Topics   • Alcohol use: Not Currently   • Drug use: Never       No current [facility]-ordered outpatient medications on file.     No current [facility]-ordered facility-administered medications on file.        Allergies:  Pcn [penicillins]    Health Maintenance: Completed    ROS:  Gen: no fevers/chills, no changes in weight  Eyes: no changes in vision  ENT: no sore throat, no hearing loss, no bloody nose  Pulm: no sob, no cough  CV: no chest pain, no palpitations        Objective:     Exam:  /90 (BP Location: Left arm, Patient Position: Sitting, BP Cuff Size: Adult)   Pulse 68   Temp 36.9 °C (98.4 °F) (Temporal)   Resp 16   Ht 1.778 m (5' 10\")   Wt 94.2 kg (207 lb 9.6 oz)   SpO2 97%   BMI 29.79 kg/m²  Body mass index is 29.79 kg/m².    Constitutional: Alert, no distress, well-groomed.  Skin: Inspection of his posterior neck shows evidence of a 1 inch in diameter subcutaneous lesion with clear defined borders.  It is mildly firm and movable.  No central pore formation appreciated.  No erythema.  Nontender to palpation.  Eye: Equal, round and reactive, conjunctiva clear, lids normal.  ENMT: Lips without lesions, good dentition, moist mucous membranes.  Neck: Trachea midline, no masses, no thyromegaly.  Respiratory: " Unlabored respiratory effort, no cough.  MSK: Normal gait, moves all extremities.  Neuro: Grossly non-focal.   Psych: Alert and oriented x3, normal affect and mood.        Assessment & Plan:     45 y.o. male with the following -     1. Lump in neck  This is a new problem reported to me.  The patient has been noticing a right posterior neck lump of 1.5 years duration.  Physical exam is most consistent with a lipoma versus epidermoid cyst.  I did offer the patient an ultrasound to further characterize this.  However, he would like to hold off on this as it is not bothering him.  We will continue to monitor this.      2. Well adult exam  Baseline labs will be established today.  - CBC WITHOUT DIFFERENTIAL; Future  - Comp Metabolic Panel; Future  - Lipid Profile; Future  - VITAMIN D,25 HYDROXY; Future  - HEMOGLOBIN A1C; Future      Return in about 6 months (around 2/17/2021) for PHYSICAL.    Please note that this dictation was created using voice recognition software. I have made every reasonable attempt to correct obvious errors, but I expect that there are errors of grammar and possibly content that I did not discover before finalizing the note.

## 2020-09-12 ENCOUNTER — HOSPITAL ENCOUNTER (OUTPATIENT)
Dept: LAB | Facility: MEDICAL CENTER | Age: 45
End: 2020-09-12
Attending: FAMILY MEDICINE
Payer: COMMERCIAL

## 2020-09-12 DIAGNOSIS — Z00.00 WELL ADULT EXAM: ICD-10-CM

## 2020-09-12 LAB
25(OH)D3 SERPL-MCNC: 38 NG/ML (ref 30–100)
ALBUMIN SERPL BCP-MCNC: 4.9 G/DL (ref 3.2–4.9)
ALBUMIN/GLOB SERPL: 2.2 G/DL
ALP SERPL-CCNC: 85 U/L (ref 30–99)
ALT SERPL-CCNC: 19 U/L (ref 2–50)
ANION GAP SERPL CALC-SCNC: 12 MMOL/L (ref 7–16)
AST SERPL-CCNC: 13 U/L (ref 12–45)
BILIRUB SERPL-MCNC: 0.8 MG/DL (ref 0.1–1.5)
BUN SERPL-MCNC: 19 MG/DL (ref 8–22)
CALCIUM SERPL-MCNC: 9.7 MG/DL (ref 8.5–10.5)
CHLORIDE SERPL-SCNC: 97 MMOL/L (ref 96–112)
CHOLEST SERPL-MCNC: 203 MG/DL (ref 100–199)
CO2 SERPL-SCNC: 22 MMOL/L (ref 20–33)
CREAT SERPL-MCNC: 1.11 MG/DL (ref 0.5–1.4)
ERYTHROCYTE [DISTWIDTH] IN BLOOD BY AUTOMATED COUNT: 38.2 FL (ref 35.9–50)
EST. AVERAGE GLUCOSE BLD GHB EST-MCNC: 100 MG/DL
FASTING STATUS PATIENT QL REPORTED: NORMAL
GLOBULIN SER CALC-MCNC: 2.2 G/DL (ref 1.9–3.5)
GLUCOSE SERPL-MCNC: 89 MG/DL (ref 65–99)
HBA1C MFR BLD: 5.1 % (ref 0–5.6)
HCT VFR BLD AUTO: 49.2 % (ref 42–52)
HDLC SERPL-MCNC: 52 MG/DL
HGB BLD-MCNC: 17.3 G/DL (ref 14–18)
LDLC SERPL CALC-MCNC: 132 MG/DL
MCH RBC QN AUTO: 29.7 PG (ref 27–33)
MCHC RBC AUTO-ENTMCNC: 35.2 G/DL (ref 33.7–35.3)
MCV RBC AUTO: 84.5 FL (ref 81.4–97.8)
PLATELET # BLD AUTO: 267 K/UL (ref 164–446)
PMV BLD AUTO: 10.1 FL (ref 9–12.9)
POTASSIUM SERPL-SCNC: 4.2 MMOL/L (ref 3.6–5.5)
PROT SERPL-MCNC: 7.1 G/DL (ref 6–8.2)
RBC # BLD AUTO: 5.82 M/UL (ref 4.7–6.1)
SODIUM SERPL-SCNC: 131 MMOL/L (ref 135–145)
TRIGL SERPL-MCNC: 97 MG/DL (ref 0–149)
WBC # BLD AUTO: 8.7 K/UL (ref 4.8–10.8)

## 2020-09-12 PROCEDURE — 36415 COLL VENOUS BLD VENIPUNCTURE: CPT

## 2020-09-12 PROCEDURE — 83036 HEMOGLOBIN GLYCOSYLATED A1C: CPT

## 2020-09-12 PROCEDURE — 80053 COMPREHEN METABOLIC PANEL: CPT

## 2020-09-12 PROCEDURE — 80061 LIPID PANEL: CPT

## 2020-09-12 PROCEDURE — 82306 VITAMIN D 25 HYDROXY: CPT

## 2020-09-12 PROCEDURE — 85027 COMPLETE CBC AUTOMATED: CPT

## 2020-11-16 ENCOUNTER — HOSPITAL ENCOUNTER (OUTPATIENT)
Facility: MEDICAL CENTER | Age: 45
End: 2020-11-16
Attending: PHYSICIAN ASSISTANT
Payer: COMMERCIAL

## 2020-11-16 ENCOUNTER — OFFICE VISIT (OUTPATIENT)
Dept: URGENT CARE | Facility: PHYSICIAN GROUP | Age: 45
End: 2020-11-16
Payer: COMMERCIAL

## 2020-11-16 VITALS
HEIGHT: 70 IN | OXYGEN SATURATION: 96 % | TEMPERATURE: 96.9 F | DIASTOLIC BLOOD PRESSURE: 92 MMHG | WEIGHT: 212 LBS | SYSTOLIC BLOOD PRESSURE: 122 MMHG | BODY MASS INDEX: 30.35 KG/M2 | HEART RATE: 70 BPM

## 2020-11-16 DIAGNOSIS — Z20.822 SUSPECTED COVID-19 VIRUS INFECTION: ICD-10-CM

## 2020-11-16 DIAGNOSIS — J06.9 URI WITH COUGH AND CONGESTION: ICD-10-CM

## 2020-11-16 LAB
COVID ORDER STATUS COVID19: NORMAL
SARS-COV-2 RNA RESP QL NAA+PROBE: NOTDETECTED
SPECIMEN SOURCE: NORMAL

## 2020-11-16 PROCEDURE — 99214 OFFICE O/P EST MOD 30 MIN: CPT | Mod: CS | Performed by: PHYSICIAN ASSISTANT

## 2020-11-16 PROCEDURE — U0003 INFECTIOUS AGENT DETECTION BY NUCLEIC ACID (DNA OR RNA); SEVERE ACUTE RESPIRATORY SYNDROME CORONAVIRUS 2 (SARS-COV-2) (CORONAVIRUS DISEASE [COVID-19]), AMPLIFIED PROBE TECHNIQUE, MAKING USE OF HIGH THROUGHPUT TECHNOLOGIES AS DESCRIBED BY CMS-2020-01-R: HCPCS

## 2020-11-16 ASSESSMENT — ENCOUNTER SYMPTOMS
SORE THROAT: 1
FEVER: 0
COUGH: 1
EYE REDNESS: 0
VOMITING: 0
DIARRHEA: 0
WHEEZING: 0
EYE DISCHARGE: 0
NAUSEA: 0
MYALGIAS: 0
SHORTNESS OF BREATH: 0
HEADACHES: 1

## 2020-11-16 ASSESSMENT — FIBROSIS 4 INDEX: FIB4 SCORE: 0.49

## 2020-11-16 NOTE — LETTER
November 16, 2020         Patient: Kedar Michele III   YOB: 1975   Date of Visit: 11/16/2020     To Whom it May Concern,     Your employee was seen in our clinic today.  A concern for COVID-19 has been identified and testing is in progress.      We are asking you to excuse absences while following self-isolation protocol per Center for Disease Control (CDC) guidelines.  Your employee will be able to access test results through our electronic delivery system called [employer].      If the results of testing are negative, and once there has been no fever (temperature >100.4 F) for at least 72 hours without treatment, and no vomiting or diarrhea for at least 48 hours, then return to work is approved.       If the results of testing are positive then your employee will be contacted by the [facility] or [facility] department for further instructions on duration of self-isolation and return to work protocol. In general, this will also follow the CDC guidelines with a minimum of 10 days from the onset of symptoms and without fever, vomiting, or diarrhea as above.      In general, repeat testing is not necessary and not offered through our [facility].      This is the only note that will be provided from [facility] for this visit.  Your employee will require an appointment with a primary care provider if FMLA or disability forms are required.       Sincerely,    Carly Cox P.A.-C.  Electronically Signed

## 2020-11-16 NOTE — PROGRESS NOTES
Subjective:      Kedar Michele III is a 45 y.o. male who presents with Headache (sore throat cough fatigue headache x3 days)        URI   This is a new problem. Episode onset: x 3 days ago. The problem has been unchanged. There has been no fever. Associated symptoms include congestion, coughing (The patient reports an intermittent cough.), headaches, a plugged ear sensation and a sore throat (The patient reports associated irritation to his throat, which improves throughout the day.). Pertinent negatives include no chest pain, diarrhea, ear pain, nausea, rash, vomiting or wheezing. He has tried nothing for the symptoms.     The patient reports no known exposure to COVID-19.  The patient states his son is sick with similar symptoms.    PMH:  has a past medical history of Brain hemangioma (HCC).  MEDS: No current outpatient medications on file.  ALLERGIES:   Allergies   Allergen Reactions   • Pcn [Penicillins]      SURGHX: History reviewed. No pertinent surgical history.  SOCHX:  reports that he has never smoked. He has never used smokeless tobacco. He reports previous alcohol use. He reports that he does not use drugs.  FH: Family history was reviewed, no pertinent findings to report    Review of Systems   Constitutional: Positive for malaise/fatigue. Negative for fever.   HENT: Positive for congestion and sore throat (The patient reports associated irritation to his throat, which improves throughout the day.). Negative for ear pain.    Eyes: Negative for discharge and redness.   Respiratory: Positive for cough (The patient reports an intermittent cough.). Negative for shortness of breath and wheezing.    Cardiovascular: Negative for chest pain and leg swelling.   Gastrointestinal: Negative for diarrhea, nausea and vomiting.   Musculoskeletal: Negative for myalgias.   Skin: Negative for rash.   Neurological: Positive for headaches.   All other systems reviewed and are negative.         Objective:     BP  "122/92   Pulse 70   Temp 36.1 °C (96.9 °F)   Ht 1.778 m (5' 10\")   Wt 96.2 kg (212 lb)   SpO2 96%   BMI 30.42 kg/m²      Physical Exam  Constitutional:       General: He is not in acute distress.     Appearance: Normal appearance. He is not ill-appearing.   HENT:      Head: Normocephalic and atraumatic.      Right Ear: External ear normal.      Left Ear: External ear normal.      Nose: Nose normal.      Mouth/Throat:      Mouth: Mucous membranes are moist.      Pharynx: Oropharynx is clear. No posterior oropharyngeal erythema.   Eyes:      Extraocular Movements: Extraocular movements intact.      Conjunctiva/sclera: Conjunctivae normal.   Neck:      Musculoskeletal: Normal range of motion and neck supple.   Cardiovascular:      Rate and Rhythm: Normal rate and regular rhythm.      Heart sounds: Normal heart sounds.   Pulmonary:      Effort: Pulmonary effort is normal. No respiratory distress.      Breath sounds: Normal breath sounds. No wheezing.   Musculoskeletal: Normal range of motion.   Skin:     General: Skin is warm and dry.   Neurological:      Mental Status: He is alert and oriented to person, place, and time.            Progress:  COVID-19 PCR - pending        Assessment/Plan:        1. URI with cough and congestion    2. Suspected COVID-19 virus infection  - COVID/SARS COV-2 PCR; Future    The patient's presenting symptoms and physical exam findings are consistent with a URI with associated cough and congestion.  The patient is concerned about possible COVID-19.  The patient's physical exam today in clinic was normal.  The patient's lungs were clear to auscultation without wheezing, and his pulse ox was within normal limits.  The patient appears in no acute distress.  The patient's vital signs are stable and within normal limits.  He is afebrile today in clinic.  Discussed likely viral etiology with the patient.  Based on the patient's presenting symptoms, will test the patient for COVID-19.  Advised " the patient to stay home under self quarantine while awaiting the test results.  Provided patient with home isolation self quarantine instructions, as well as a work note.  Recommend OTC medications and supportive care for symptomatic management.  Recommend patient follow-up with his PCP.  Discussed return precautions with the patient, and he verbalized understanding.    Differential diagnoses, supportive care, and indications for immediate follow-up discussed with patient.   Instructed to return to clinic or nearest emergency department for any change in condition, further concerns, or worsening of symptoms.    OTC Tylenol or Motrin for fever/discomfort.  OTC cough/cold medication for symptomatic relief  OTC Supportive Care for Congestion - saline nasal spray or neti pot  Drink plenty of fluids  Advised the patient to stay at home under self-isolation until symptoms have been present for at least 10 days and are improved, and there has been no fever for at least 72 hours without the use of medications and/or no vomiting or diarrhea for 48 hours.  --Provided the patient with home isolation and self quarantine instructions  Work note provided  Follow-up with PCP  Return to clinic or go to the ED if symptoms worsen or fail to improve, or if the patient should develop worsening/increasing cough, congestion, ear pain, sore throat, shortness of breath, wheezing, chest pain, fever/chills, and/or any concerning symptoms.    Discussed plan with the patient, and he agrees to the above.    Please note that this dictation was created using voice recognition software. I have made every reasonable attempt to correct obvious errors, but I expect that there may be errors of grammar and possibly content that I did not discover before finalizing the note.

## 2020-11-16 NOTE — PATIENT INSTRUCTIONS
INSTRUCTIONS FOR COVID-19 OR ANY OTHER INFECTIOUS RESPIRATORY ILLNESSES    The Centers for Disease Control and Prevention (CDC) states that early indications for COVID-19 include cough, shortness of breath, difficulty breathing, or at least two of the following symptoms: chills, shaking with chills, muscle pain, headache, sore throat, and loss of taste or smell. Symptoms can range from mild to severe and may appear up to two weeks after exposure to the virus.    The practice of self-isolation and quarantine helps protect the public and your family by  preventing exposure to people who have or may have a contagious disease. Please follow the prevention steps below as based on CDC guidelines:    WHEN TO STOP ISOLATION: Persons with COVID-19 or any other infectious respiratory illness who have symptoms and were advised to care for themselves at home may discontinue home isolation under the following conditions:  · At least 24 hours have passed since recovery defined as resolution of fever without the use of fever-reducing medications; AND,  · Improvement in respiratory symptoms (e.g., cough, shortness of breath); AND,  · At least 10 days have passed since symptoms first appeared and have had no subsequent illness.    MONITOR YOUR SYMPTOMS: If your illness is worsening, seek prompt medical attention. If you have a medical emergency and need to call 911, notify the dispatch personnel that you have, or are being evaluated for confirmed or suspected COVID-19 or another infectious respiratory illness. Wear a facemask if possible.    ACTIVITY RESTRICTION: restrict activities outside your home, except for getting medical care. Do not go to work, school, or public areas. Avoid using public transportation, ride-sharing, or taxis.    SCHEDULED MEDICAL APPOINTMENTS: Notify your provider that you have, or are being evaluated for, confirmed or suspected COVID-19 or another infectious respiratory. This will help the healthcare  provider’s office safely take care of you and keep other people from getting exposed or infected.    FACEMASKS, when to wear: Anytime you are away from your home or around other people or pets. If you are unable to wear one, maintain a minimum of 6 feet distancing from others.    LIVING ENVIRONMENT: Stay in a separate room from other people and pets. If possible, use a separate bathroom, have someone else care for your pets and avoid sharing household items. Any items used should be washed thoroughly with soap and water. Clean all “high-touch” surfaces every day. Use a household cleaning spray or wipe, according to the label instructions. High touch surfaces include (but are not limited to) counters, tabletops, doorknobs, bathroom fixtures, toilets, phones, keyboards, tablets, and bedside tables.     HAND WASHING: Frequently wash hands with soap and water for at least 20 seconds,  especially after blowing your nose, coughing, or sneezing; going to the bathroom; before and after interacting with pets; and before and after eating or preparing food. If hands are visibly dirty use soap and water. If soap and water are not available, use an alcohol-based hand  with at least 60% alcohol. Avoid touching your eyes, nose, and mouth with unwashed hands. Cover your coughs and sneezes with a tissue. Throw used tissues in a lined trash can. Immediately wash your hands.    ACTIVE/FACILITATED SELF-MONITORING: Follow instructions provided by your local health department or health professionals, as appropriate. When working with your local health department check their available hours.    [facility]   Phone Number   [facility] [phone]   [facility] [place] [phone]   [address]   [place] [phone]     IF YOU HAVE CONFIRMED POSITIVE COVID-19:    Those who have completely recovered from COVID-19 may have immune-boosting antibodies in their plasma--called “convalescent plasma”--that could be  used to treat critically ill COVID19 patients.    [facility] is excited to begin working with [employer] on collecting convalescent plasma from  people who have recovered from COVID-19 as part of a program to treat patients infected with the virus. This FDA-approved “emergency investigational new drug” is a special blood product containing antibodies that may give patients an extra boost to fight the virus.    To be eligible to donate convalescent plasma, you must have a prior COVID-19 diagnosis documented by a laboratory test (or a positive test result for SARS-CoV-2 antibodies) and meet additional eligibility requirements.    If you are interested in donating convalescent plasma or have any additional questions, please contact the [facility] Convalescent Plasma  at [phone] or via e-mail at [email or web address].

## 2021-03-08 ENCOUNTER — TELEPHONE (OUTPATIENT)
Dept: PHYSICAL THERAPY | Facility: REHABILITATION | Age: 46
End: 2021-03-08

## 2021-03-08 NOTE — OP THERAPY DISCHARGE SUMMARY
Outpatient Physical Therapy  DISCHARGE SUMMARY NOTE      [facility] Physical Therapy [address], [address]  [place] [address]  Phone:  [phone]    Date of Visit: 03/08/2021    Patient: Kedar Michele III  YOB: 1975  MRN: [id number]     Referring Provider: No referring provider defined for this encounter.   Referring Diagnosis No admission diagnoses are documented for this encounter.         Functional Assessment Used        Your patient is being discharged from Physical Therapy with the following comments:   · Patient has failed to schedule or reschedule follow-up visits    Limitations Remaining:  Patient has made progress in trunk and hip mobility and his symptoms have not been reproduced in physical therapy sessions     Recommendations:  Follow up with PCP for future POC as needed     Azar Neil, PT    Date: 3/8/2021

## 2022-04-20 ENCOUNTER — PATIENT MESSAGE (OUTPATIENT)
Dept: MEDICAL GROUP | Facility: PHYSICIAN GROUP | Age: 47
End: 2022-04-20
Payer: COMMERCIAL

## 2022-04-20 DIAGNOSIS — Z12.5 SCREENING FOR MALIGNANT NEOPLASM OF PROSTATE: ICD-10-CM

## 2022-06-10 ENCOUNTER — TELEMEDICINE (OUTPATIENT)
Dept: MEDICAL GROUP | Facility: PHYSICIAN GROUP | Age: 47
End: 2022-06-10
Payer: COMMERCIAL

## 2022-06-10 VITALS — WEIGHT: 200 LBS | HEIGHT: 70 IN | TEMPERATURE: 99.8 F | BODY MASS INDEX: 28.63 KG/M2

## 2022-06-10 DIAGNOSIS — R09.81 NASAL CONGESTION: ICD-10-CM

## 2022-06-10 DIAGNOSIS — R53.83 OTHER FATIGUE: ICD-10-CM

## 2022-06-10 DIAGNOSIS — U07.1 COVID-19: ICD-10-CM

## 2022-06-10 PROCEDURE — 99213 OFFICE O/P EST LOW 20 MIN: CPT | Mod: 95 | Performed by: FAMILY MEDICINE

## 2022-06-10 RX ORDER — DOXYCYCLINE HYCLATE 100 MG/1
100 CAPSULE ORAL 2 TIMES DAILY
Qty: 14 EACH | Refills: 0 | Status: SHIPPED | OUTPATIENT
Start: 2022-06-10 | End: 2022-06-17

## 2022-06-10 ASSESSMENT — FIBROSIS 4 INDEX: FIB4 SCORE: 0.51

## 2022-06-10 ASSESSMENT — PATIENT HEALTH QUESTIONNAIRE - PHQ9: CLINICAL INTERPRETATION OF PHQ2 SCORE: 0

## 2022-06-10 NOTE — LETTER
[address]  [facility]  [address]  [address]  [facility] [address]     Lisbeth 10, 2022    Patient: Kedar Michele III   YOB: 1975   Date of Visit: 6/10/2022       To Whom It May Concern:    Kedar Michele was seen and treated in our department on 6/10/2022.     Sincerely,     Dat Solorio M.D.

## 2022-06-10 NOTE — PROGRESS NOTES
"Virtual Visit: Established Patient   This visit was conducted via Zoom using secure and encrypted videoconferencing technology.   The patient was in their home in the state of [place].    The patient's identity was confirmed and verbal consent was obtained for this virtual visit.     Subjective:   CC:   Chief Complaint   Patient presents with   • Other     POSITIVE COVID       Kedar Michele III is a 47 y.o. male presenting for evaluation and management of:    #covid positive  #nasal congestion  #Cough  #fatigue  New problem  Double sickness  Endorsing worsening symptoms over the past week  Nasal congestion     ROS   neg    Current medicines (including changes today)  Current Outpatient Medications   Medication Sig Dispense Refill   • Nirmatrelvir & Ritonavir 20 x 150 MG & 10 x 100MG Tablet Therapy Pack Take 300 mg nirmatrelvir (two 150 mg tablets) with 100 mg ritonavir (one 100 mg tablet) by mouth, with all three tablets taken together twice daily for 5 days. 30 Each 0   • doxycycline (VIBRAMYCIN) 100 MG Cap Take 1 Capsule by mouth 2 times a day for 7 days. 14 Each 0     No current facility-administered medications for this visit.       Patient Active Problem List    Diagnosis Date Noted   • COVID-19 06/10/2022   • Nasal congestion 06/10/2022   • Other fatigue 06/10/2022   • Lump in neck 08/17/2020   • Back pain 12/13/2019        Objective:   Temp 37.7 °C (99.8 °F) (Temporal)   Ht 1.778 m (5' 10\") Comment: per patient  Wt 90.7 kg (200 lb) Comment: per patient  BMI 28.70 kg/m²     Physical Exam:  Constitutional: Alert, no distress, well-groomed.  Skin: No rashes in visible areas.  Eye: Round. Conjunctiva clear, lids normal. No icterus.   ENMT: Lips pink without lesions, good dentition, moist mucous membranes. Phonation normal.  Neck: No masses, no thyromegaly. Moves freely without pain.  Respiratory: Unlabored respiratory effort, no cough or audible wheeze  Psych: Alert and oriented x3, normal affect and " mood.     Assessment and Plan:   The following treatment plan was discussed:     1. COVID-19  2. Nasal congestion  3. Other fatigue  New problem. May have superinfection. Will treat with paxlovid and antibiotic.   - Nirmatrelvir & Ritonavir 20 x 150 MG & 10 x 100MG Tablet Therapy Pack; Take 300 mg nirmatrelvir (two 150 mg tablets) with 100 mg ritonavir (one 100 mg tablet) by mouth, with all three tablets taken together twice daily for 5 days.  Dispense: 30 Each; Refill: 0  - doxycycline (VIBRAMYCIN) 100 MG Cap; Take 1 Capsule by mouth 2 times a day for 7 days.  Dispense: 14 Each; Refill: 0          Follow-up: No follow-ups on file.

## 2022-07-26 ENCOUNTER — TELEMEDICINE (OUTPATIENT)
Dept: MEDICAL GROUP | Facility: PHYSICIAN GROUP | Age: 47
End: 2022-07-26
Payer: COMMERCIAL

## 2022-07-26 VITALS — BODY MASS INDEX: 29.2 KG/M2 | WEIGHT: 204 LBS | HEIGHT: 70 IN

## 2022-07-26 DIAGNOSIS — G43.109 MIGRAINE WITH AURA AND WITHOUT STATUS MIGRAINOSUS, NOT INTRACTABLE: ICD-10-CM

## 2022-07-26 PROCEDURE — 99214 OFFICE O/P EST MOD 30 MIN: CPT | Mod: 95 | Performed by: FAMILY MEDICINE

## 2022-07-26 RX ORDER — SUMATRIPTAN 100 MG/1
100 TABLET, FILM COATED ORAL
Qty: 10 TABLET | Refills: 3 | Status: SHIPPED | OUTPATIENT
Start: 2022-07-26 | End: 2023-09-19

## 2022-07-26 RX ORDER — TOPIRAMATE SPINKLE 25 MG/1
25 CAPSULE ORAL DAILY
Qty: 90 CAPSULE | Refills: 0 | Status: SHIPPED | OUTPATIENT
Start: 2022-07-26 | End: 2022-10-31

## 2022-07-26 ASSESSMENT — FIBROSIS 4 INDEX: FIB4 SCORE: 0.51

## 2022-07-26 NOTE — PROGRESS NOTES
"Virtual Visit: Established Patient   This visit was conducted via Zoom using secure and encrypted videoconferencing technology.   The patient was in their home in the state of [place].    The patient's identity was confirmed and verbal consent was obtained for this virtual visit.    Subjective:   CC:   Chief Complaint   Patient presents with   • Migraine     Kedar Michele III is a 47 y.o. male presenting for evaluation and management of:    #migraine headaches  Recurrent  Last cluster was about 6 years ago  Returned about 1 mo ago  Getting them daily now  Has tried sumatriptan and NSAIDs which didn't help much   Was seeing neurology in the past    ROS   neg    Current medicines (including changes today)  Current Outpatient Medications   Medication Sig Dispense Refill   • sumatriptan (IMITREX) 100 MG tablet Take 1 Tablet by mouth one time as needed for Migraine for up to 1 dose. 10 Tablet 3   • topiramate (TOPAMAX) 25 MG capsule Take 1 Capsule by mouth every day. 90 Capsule 0     No current facility-administered medications for this visit.       Patient Active Problem List    Diagnosis Date Noted   • COVID-19 06/10/2022   • Nasal congestion 06/10/2022   • Other fatigue 06/10/2022   • Lump in neck 08/17/2020   • Back pain 12/13/2019        Objective:   Ht 1.778 m (5' 10\") Comment: pt reported  Wt 92.5 kg (204 lb) Comment: pt reported  BMI 29.27 kg/m²     Physical Exam:  Constitutional: Alert, no distress, well-groomed.  Skin: No rashes in visible areas.  Eye: Round. Conjunctiva clear, lids normal. No icterus.   ENMT: Lips pink without lesions, good dentition, moist mucous membranes. Phonation normal.  Neck: No masses, no thyromegaly. Moves freely without pain.  Respiratory: Unlabored respiratory effort, no cough or audible wheeze  Psych: Alert and oriented x3, normal affect and mood.     Assessment and Plan:   The following treatment plan was discussed:     1. Migraine with aura and without status " migrainosus, not intractable  Chronic, worsening condition.  Was migraine free for approximately 6 weeks.  They have now returned.  This is very intractable type migraine headache in the past.  I would like to pursue imaging and refer to neurology.  I will do an another trial of sumatriptan for breakthrough headache we will also initiate prophylaxis with Topamax.  We will reassess in 4 weeks.  Strict emergency room precautions given.  - MR-MRA HEAD-W/O; Future  - Referral to Neurology  - sumatriptan (IMITREX) 100 MG tablet; Take 1 Tablet by mouth one time as needed for Migraine for up to 1 dose.  Dispense: 10 Tablet; Refill: 3  - topiramate (TOPAMAX) 25 MG capsule; Take 1 Capsule by mouth every day.  Dispense: 90 Capsule; Refill: 0      Follow-up: No follow-ups on file.

## 2022-08-04 ENCOUNTER — HOSPITAL ENCOUNTER (OUTPATIENT)
Dept: RADIOLOGY | Facility: MEDICAL CENTER | Age: 47
End: 2022-08-04
Attending: FAMILY MEDICINE
Payer: COMMERCIAL

## 2022-08-04 DIAGNOSIS — G43.109 MIGRAINE WITH AURA AND WITHOUT STATUS MIGRAINOSUS, NOT INTRACTABLE: ICD-10-CM

## 2022-08-04 PROCEDURE — 70544 MR ANGIOGRAPHY HEAD W/O DYE: CPT

## 2022-08-23 ENCOUNTER — TELEMEDICINE (OUTPATIENT)
Dept: MEDICAL GROUP | Facility: PHYSICIAN GROUP | Age: 47
End: 2022-08-23
Payer: COMMERCIAL

## 2022-08-23 VITALS — HEART RATE: 67 BPM | WEIGHT: 205 LBS | BODY MASS INDEX: 29.35 KG/M2 | HEIGHT: 70 IN

## 2022-08-23 DIAGNOSIS — G43.109 MIGRAINE WITH AURA AND WITHOUT STATUS MIGRAINOSUS, NOT INTRACTABLE: ICD-10-CM

## 2022-08-23 PROCEDURE — 99214 OFFICE O/P EST MOD 30 MIN: CPT | Mod: 95 | Performed by: FAMILY MEDICINE

## 2022-08-23 ASSESSMENT — FIBROSIS 4 INDEX: FIB4 SCORE: 0.51

## 2022-08-23 NOTE — PROGRESS NOTES
"Virtual Visit: Established Patient   This visit was conducted via Zoom using secure and encrypted videoconferencing technology.   The patient was in their home in the state of [place].    The patient's identity was confirmed and verbal consent was obtained for this virtual visit.     Subjective:   CC:   Chief Complaint   Patient presents with    Follow-Up     Migraines       Kedar Michele III is a 47 y.o. male presenting for evaluation and management of:    #migraines  Chronic issue  Imaging and negative for any acute problems   Trial of of topamax 25 and has cut off the edge in the meantime  HAs have gotten better  To connect with neuro 10/22    ROS   Neg      Current medicines (including changes today)  Current Outpatient Medications   Medication Sig Dispense Refill    sumatriptan (IMITREX) 100 MG tablet Take 1 Tablet by mouth one time as needed for Migraine for up to 1 dose. 10 Tablet 3    topiramate (TOPAMAX) 25 MG capsule Take 1 Capsule by mouth every day. 90 Capsule 0     No current facility-administered medications for this visit.       Patient Active Problem List    Diagnosis Date Noted    Migraine with aura and without status migrainosus, not intractable 08/23/2022    COVID-19 06/10/2022    Nasal congestion 06/10/2022    Other fatigue 06/10/2022    Lump in neck 08/17/2020    Back pain 12/13/2019        Objective:   Pulse 67 Comment: Patient reported  Ht 1.778 m (5' 10\") Comment: Patient reported  Wt 93 kg (205 lb) Comment: Patient reported  BMI 29.41 kg/m²     Physical Exam:  Constitutional: Alert, no distress, well-groomed.  Skin: No rashes in visible areas.  Eye: Round. Conjunctiva clear, lids normal. No icterus.   ENMT: Lips pink without lesions, good dentition, moist mucous membranes. Phonation normal.  Neck: No masses, no thyromegaly. Moves freely without pain.  Respiratory: Unlabored respiratory effort, no cough or audible wheeze  Psych: Alert and oriented x3, normal affect and mood. "     Assessment and Plan:   The following treatment plan was discussed:     1. Migraine with aura and without status migrainosus, not intractable  Chronic condition.  Stable.  I will go up on Topamax dosage.  Recommend he continues to take medicine as prescribed.  I also recommend he establishes with neurology in October.    Follow-up: No follow-ups on file.

## 2022-10-30 DIAGNOSIS — G43.109 MIGRAINE WITH AURA AND WITHOUT STATUS MIGRAINOSUS, NOT INTRACTABLE: ICD-10-CM

## 2022-10-31 RX ORDER — TOPIRAMATE SPINKLE 25 MG/1
25 CAPSULE ORAL DAILY
Qty: 90 CAPSULE | Refills: 0 | Status: SHIPPED | OUTPATIENT
Start: 2022-10-31 | End: 2023-01-09

## 2023-01-09 ENCOUNTER — TELEMEDICINE (OUTPATIENT)
Dept: MEDICAL GROUP | Facility: PHYSICIAN GROUP | Age: 48
End: 2023-01-09
Payer: COMMERCIAL

## 2023-01-09 VITALS — WEIGHT: 202 LBS | HEIGHT: 70 IN | BODY MASS INDEX: 28.92 KG/M2

## 2023-01-09 DIAGNOSIS — Z12.12 SCREENING FOR COLORECTAL CANCER: ICD-10-CM

## 2023-01-09 DIAGNOSIS — Z12.11 SCREENING FOR COLORECTAL CANCER: ICD-10-CM

## 2023-01-09 DIAGNOSIS — G43.109 MIGRAINE WITH AURA AND WITHOUT STATUS MIGRAINOSUS, NOT INTRACTABLE: ICD-10-CM

## 2023-01-09 DIAGNOSIS — Z12.83 SKIN CANCER SCREENING: ICD-10-CM

## 2023-01-09 DIAGNOSIS — Z12.5 SCREENING FOR MALIGNANT NEOPLASM OF PROSTATE: ICD-10-CM

## 2023-01-09 PROCEDURE — 99213 OFFICE O/P EST LOW 20 MIN: CPT | Mod: 95 | Performed by: FAMILY MEDICINE

## 2023-01-09 ASSESSMENT — PATIENT HEALTH QUESTIONNAIRE - PHQ9: CLINICAL INTERPRETATION OF PHQ2 SCORE: 0

## 2023-01-09 NOTE — PROGRESS NOTES
"Virtual Visit: Established Patient   This visit was conducted via Zoom using secure and encrypted videoconferencing technology.   The patient was in their home in the state of [place].    The patient's identity was confirmed and verbal consent was obtained for this virtual visit.     Subjective:   CC:   Chief Complaint   Patient presents with    Requesting Labs     Cancer screening       Kedar Michele III is a 47 y.o. male presenting for evaluation and management of:    #migraines  Chronic issue  Very stable at this time  On imitrex      ROS   neg    Current medicines (including changes today)  Current Outpatient Medications   Medication Sig Dispense Refill    sumatriptan (IMITREX) 100 MG tablet Take 1 Tablet by mouth one time as needed for Migraine for up to 1 dose. 10 Tablet 3     No current facility-administered medications for this visit.       Patient Active Problem List    Diagnosis Date Noted    Migraine with aura and without status migrainosus, not intractable 08/23/2022    COVID-19 06/10/2022    Nasal congestion 06/10/2022    Other fatigue 06/10/2022    Lump in neck 08/17/2020    Back pain 12/13/2019        Objective:   Ht 1.778 m (5' 10\") Comment: pt reported  Wt 91.6 kg (202 lb) Comment: pt reported  BMI 28.98 kg/m²     Physical Exam:  Constitutional: Alert, no distress, well-groomed.  Skin: No rashes in visible areas.  Eye: Round. Conjunctiva clear, lids normal. No icterus.   ENMT: Lips pink without lesions, good dentition, moist mucous membranes. Phonation normal.  Neck: No masses, no thyromegaly. Moves freely without pain.  Respiratory: Unlabored respiratory effort, no cough or audible wheeze  Psych: Alert and oriented x3, normal affect and mood.     Assessment and Plan:   The following treatment plan was discussed:     1. Migraine with aura and without status migrainosus, not intractable  Chronic, stable condition.  Continue with Imitrex as needed.    2. Screening for malignant neoplasm of " prostate  - PROSTATE SPECIFIC AG SCREENING; Future    3. Screening for colorectal cancer  - Referral to GI for Colonoscopy    4. Skin cancer screening  - Referral to Dermatology      Follow-up: No follow-ups on file.

## 2023-01-12 ENCOUNTER — HOSPITAL ENCOUNTER (OUTPATIENT)
Dept: LAB | Facility: MEDICAL CENTER | Age: 48
End: 2023-01-12
Attending: FAMILY MEDICINE
Payer: COMMERCIAL

## 2023-01-12 DIAGNOSIS — Z12.5 SCREENING FOR MALIGNANT NEOPLASM OF PROSTATE: ICD-10-CM

## 2023-01-12 LAB — PSA SERPL-MCNC: 1.09 NG/ML (ref 0–4)

## 2023-01-12 PROCEDURE — 84153 ASSAY OF PSA TOTAL: CPT

## 2023-01-12 PROCEDURE — 36415 COLL VENOUS BLD VENIPUNCTURE: CPT

## 2023-08-23 ENCOUNTER — DOCUMENTATION (OUTPATIENT)
Dept: HEALTH INFORMATION MANAGEMENT | Facility: OTHER | Age: 48
End: 2023-08-23
Payer: COMMERCIAL

## 2023-09-12 ENCOUNTER — TELEPHONE (OUTPATIENT)
Dept: HEALTH INFORMATION MANAGEMENT | Facility: OTHER | Age: 48
End: 2023-09-12
Payer: COMMERCIAL

## 2023-09-19 ENCOUNTER — OFFICE VISIT (OUTPATIENT)
Dept: MEDICAL GROUP | Facility: PHYSICIAN GROUP | Age: 48
End: 2023-09-19
Payer: COMMERCIAL

## 2023-09-19 VITALS
HEART RATE: 82 BPM | RESPIRATION RATE: 16 BRPM | HEIGHT: 70 IN | SYSTOLIC BLOOD PRESSURE: 110 MMHG | BODY MASS INDEX: 31.21 KG/M2 | TEMPERATURE: 98 F | WEIGHT: 218 LBS | OXYGEN SATURATION: 94 % | DIASTOLIC BLOOD PRESSURE: 70 MMHG

## 2023-09-19 DIAGNOSIS — Z11.4 SCREENING FOR HIV WITHOUT PRESENCE OF RISK FACTORS: ICD-10-CM

## 2023-09-19 DIAGNOSIS — R20.0 FINGER NUMBNESS: ICD-10-CM

## 2023-09-19 DIAGNOSIS — E78.5 DYSLIPIDEMIA: ICD-10-CM

## 2023-09-19 DIAGNOSIS — Z11.59 NEED FOR HEPATITIS C SCREENING TEST: ICD-10-CM

## 2023-09-19 DIAGNOSIS — Z00.00 ROUTINE HEALTH MAINTENANCE: ICD-10-CM

## 2023-09-19 DIAGNOSIS — Z76.89 ESTABLISHING CARE WITH NEW DOCTOR, ENCOUNTER FOR: ICD-10-CM

## 2023-09-19 DIAGNOSIS — Z91.89 HIGH RISK FOR DIABETES MELLITUS: ICD-10-CM

## 2023-09-19 DIAGNOSIS — E66.09 CLASS 1 OBESITY DUE TO EXCESS CALORIES WITH SERIOUS COMORBIDITY AND BODY MASS INDEX (BMI) OF 31.0 TO 31.9 IN ADULT: ICD-10-CM

## 2023-09-19 DIAGNOSIS — E55.9 VITAMIN D DEFICIENCY: ICD-10-CM

## 2023-09-19 PROBLEM — E66.811 CLASS 1 OBESITY DUE TO EXCESS CALORIES WITH SERIOUS COMORBIDITY AND BODY MASS INDEX (BMI) OF 30.0 TO 30.9 IN ADULT: Status: ACTIVE | Noted: 2023-09-19

## 2023-09-19 PROCEDURE — 3074F SYST BP LT 130 MM HG: CPT | Performed by: NURSE PRACTITIONER

## 2023-09-19 PROCEDURE — 3078F DIAST BP <80 MM HG: CPT | Performed by: NURSE PRACTITIONER

## 2023-09-19 PROCEDURE — 99214 OFFICE O/P EST MOD 30 MIN: CPT | Performed by: NURSE PRACTITIONER

## 2023-09-19 SDOH — ECONOMIC STABILITY: INCOME INSECURITY: HOW HARD IS IT FOR YOU TO PAY FOR THE VERY BASICS LIKE FOOD, HOUSING, MEDICAL CARE, AND HEATING?: NOT VERY HARD

## 2023-09-19 SDOH — HEALTH STABILITY: MENTAL HEALTH
STRESS IS WHEN SOMEONE FEELS TENSE, NERVOUS, ANXIOUS, OR CAN'T SLEEP AT NIGHT BECAUSE THEIR MIND IS TROUBLED. HOW STRESSED ARE YOU?: RATHER MUCH

## 2023-09-19 SDOH — ECONOMIC STABILITY: HOUSING INSECURITY
IN THE LAST 12 MONTHS, WAS THERE A TIME WHEN YOU DID NOT HAVE A STEADY PLACE TO SLEEP OR SLEPT IN A SHELTER (INCLUDING NOW)?: NO

## 2023-09-19 SDOH — ECONOMIC STABILITY: HOUSING INSECURITY: IN THE LAST 12 MONTHS, HOW MANY PLACES HAVE YOU LIVED?: 1

## 2023-09-19 SDOH — HEALTH STABILITY: PHYSICAL HEALTH: ON AVERAGE, HOW MANY DAYS PER WEEK DO YOU ENGAGE IN MODERATE TO STRENUOUS EXERCISE (LIKE A BRISK WALK)?: 3 DAYS

## 2023-09-19 SDOH — HEALTH STABILITY: PHYSICAL HEALTH: ON AVERAGE, HOW MANY MINUTES DO YOU ENGAGE IN EXERCISE AT THIS LEVEL?: 30 MIN

## 2023-09-19 SDOH — ECONOMIC STABILITY: FOOD INSECURITY: WITHIN THE PAST 12 MONTHS, YOU WORRIED THAT YOUR FOOD WOULD RUN OUT BEFORE YOU GOT MONEY TO BUY MORE.: NEVER TRUE

## 2023-09-19 SDOH — ECONOMIC STABILITY: TRANSPORTATION INSECURITY
IN THE PAST 12 MONTHS, HAS LACK OF RELIABLE TRANSPORTATION KEPT YOU FROM MEDICAL APPOINTMENTS, MEETINGS, WORK OR FROM GETTING THINGS NEEDED FOR DAILY LIVING?: NO

## 2023-09-19 SDOH — ECONOMIC STABILITY: FOOD INSECURITY: WITHIN THE PAST 12 MONTHS, THE FOOD YOU BOUGHT JUST DIDN'T LAST AND YOU DIDN'T HAVE MONEY TO GET MORE.: NEVER TRUE

## 2023-09-19 SDOH — ECONOMIC STABILITY: TRANSPORTATION INSECURITY
IN THE PAST 12 MONTHS, HAS THE LACK OF TRANSPORTATION KEPT YOU FROM MEDICAL APPOINTMENTS OR FROM GETTING MEDICATIONS?: NO

## 2023-09-19 SDOH — ECONOMIC STABILITY: INCOME INSECURITY: IN THE LAST 12 MONTHS, WAS THERE A TIME WHEN YOU WERE NOT ABLE TO PAY THE MORTGAGE OR RENT ON TIME?: NO

## 2023-09-19 SDOH — ECONOMIC STABILITY: TRANSPORTATION INSECURITY
IN THE PAST 12 MONTHS, HAS LACK OF TRANSPORTATION KEPT YOU FROM MEETINGS, WORK, OR FROM GETTING THINGS NEEDED FOR DAILY LIVING?: NO

## 2023-09-19 ASSESSMENT — SOCIAL DETERMINANTS OF HEALTH (SDOH)
HOW MANY DRINKS CONTAINING ALCOHOL DO YOU HAVE ON A TYPICAL DAY WHEN YOU ARE DRINKING: PATIENT DOES NOT DRINK
WITHIN THE PAST 12 MONTHS, YOU WORRIED THAT YOUR FOOD WOULD RUN OUT BEFORE YOU GOT THE MONEY TO BUY MORE: NEVER TRUE
HOW OFTEN DO YOU GET TOGETHER WITH FRIENDS OR RELATIVES?: ONCE A WEEK
HOW OFTEN DO YOU ATTENT MEETINGS OF THE CLUB OR ORGANIZATION YOU BELONG TO?: NEVER
HOW OFTEN DO YOU GET TOGETHER WITH FRIENDS OR RELATIVES?: ONCE A WEEK
HOW OFTEN DO YOU ATTEND CHURCH OR RELIGIOUS SERVICES?: PATIENT DECLINED
HOW OFTEN DO YOU ATTENT MEETINGS OF THE CLUB OR ORGANIZATION YOU BELONG TO?: NEVER
IN A TYPICAL WEEK, HOW MANY TIMES DO YOU TALK ON THE PHONE WITH FAMILY, FRIENDS, OR NEIGHBORS?: TWICE A WEEK
DO YOU BELONG TO ANY CLUBS OR ORGANIZATIONS SUCH AS CHURCH GROUPS UNIONS, FRATERNAL OR ATHLETIC GROUPS, OR SCHOOL GROUPS?: NO
DO YOU BELONG TO ANY CLUBS OR ORGANIZATIONS SUCH AS CHURCH GROUPS UNIONS, FRATERNAL OR ATHLETIC GROUPS, OR SCHOOL GROUPS?: NO
HOW OFTEN DO YOU HAVE A DRINK CONTAINING ALCOHOL: NEVER
IN A TYPICAL WEEK, HOW MANY TIMES DO YOU TALK ON THE PHONE WITH FAMILY, FRIENDS, OR NEIGHBORS?: TWICE A WEEK
HOW OFTEN DO YOU ATTEND CHURCH OR RELIGIOUS SERVICES?: PATIENT DECLINED
HOW HARD IS IT FOR YOU TO PAY FOR THE VERY BASICS LIKE FOOD, HOUSING, MEDICAL CARE, AND HEATING?: NOT VERY HARD
HOW OFTEN DO YOU HAVE SIX OR MORE DRINKS ON ONE OCCASION: NEVER

## 2023-09-19 ASSESSMENT — LIFESTYLE VARIABLES
HOW OFTEN DO YOU HAVE SIX OR MORE DRINKS ON ONE OCCASION: NEVER
HOW OFTEN DO YOU HAVE A DRINK CONTAINING ALCOHOL: NEVER
AUDIT-C TOTAL SCORE: 0
HOW MANY STANDARD DRINKS CONTAINING ALCOHOL DO YOU HAVE ON A TYPICAL DAY: PATIENT DOES NOT DRINK
SKIP TO QUESTIONS 9-10: 1

## 2023-09-19 NOTE — ASSESSMENT & PLAN NOTE
"New to examiner, chronic for patient. Reports that for at least the last year he has had intermittent numbness and tingling in digits 2 and 3 on his right hand, with it occurring more often lately and now including his thumb. States that he is right handed, has some \"shock and zinging\" with pressure, and is dropping things often due to symptoms. He is unable to use scissors with his right hand and has had to adjust the way he uses his right hand. Denies history of injury to right hand.  "

## 2023-09-24 PROBLEM — R09.81 NASAL CONGESTION: Status: RESOLVED | Noted: 2022-06-10 | Resolved: 2023-09-24

## 2023-09-24 PROBLEM — U07.1 COVID-19: Status: RESOLVED | Noted: 2022-06-10 | Resolved: 2023-09-24

## 2023-09-24 PROBLEM — R22.1 LUMP IN NECK: Status: RESOLVED | Noted: 2020-08-17 | Resolved: 2023-09-24

## 2023-09-24 PROBLEM — M54.9 BACK PAIN: Status: RESOLVED | Noted: 2019-12-13 | Resolved: 2023-09-24

## 2023-09-24 NOTE — ASSESSMENT & PLAN NOTE
New to examiner, chronic for patient. Does not take vitamin d or calcium supplement. Due for updated labs.

## 2023-09-24 NOTE — PROGRESS NOTES
"CC:   Chief Complaint   Patient presents with    Establish Care     HISTORY OF THE PRESENT ILLNESS: Patient is a 48 y.o. male. This pleasant patient is here today to establish care and discuss multiple issues as listed below.     Health Maintenance: Reviewed    Finger numbness  New to examiner, chronic for patient. Reports that for at least the last year he has had intermittent numbness and tingling in digits 2 and 3 on his right hand, with it occurring more often lately and now including his thumb. States that he is right handed, has some \"shock and zinging\" with pressure, and is dropping things often due to symptoms. He is unable to use scissors with his right hand and has had to adjust the way he uses his right hand. Denies history of injury to right hand.    Dyslipidemia  New to examiner, chronic for patient. Due for updated labs.     Vitamin D deficiency  New to examiner, chronic for patient. Does not take vitamin d or calcium supplement. Due for updated labs.     Allergies: Pcn [penicillins]  No current Epic-ordered outpatient medications on file.     No current Epic-ordered facility-administered medications on file.     Past Medical History:   Diagnosis Date    Back pain 12/13/2019    Brain hemangioma (HCC)     COVID-19 6/10/2022    Hyperlipidemia     Lump in neck 8/17/2020    Migraine      Past Surgical History:   Procedure Laterality Date    VASECTOMY       Social History     Tobacco Use    Smoking status: Never     Passive exposure: Past    Smokeless tobacco: Never   Vaping Use    Vaping Use: Never used   Substance Use Topics    Alcohol use: Not Currently    Drug use: Never     Social History     Social History Narrative    Not on file     Family History   Problem Relation Age of Onset    Multiple Sclerosis Mother     Cancer Father         Prostate    Prostate cancer Father     No Known Problems Sister     No Known Problems Maternal Uncle     No Known Problems Paternal Aunt     No Known Problems Paternal " "Aunt     Lung Disease Paternal Uncle     Cancer Paternal Uncle     Lung Cancer Paternal Uncle     Bone Cancer Paternal Uncle     No Known Problems Paternal Uncle     No Known Problems Paternal Uncle     No Known Problems Paternal Uncle     No Known Problems Maternal Grandmother     No Known Problems Maternal Grandfather     No Known Problems Paternal Grandmother     Lung Disease Paternal Grandfather     No Known Problems Son      ROS:   Constitutional: No fevers, chills, malaise/fatigue.  Eyes: No eye pain.  ENT: No sore throat, congestion.   Resp: No cough, shortness of breath.  CV: No chest pain, leg swelling, palpitations.  GI: No nausea/vomiting, abdominal pain, constipation, diarrhea.  : No dysuria, hematuria.  MSK: No weakness.  Skin: No rashes.  Neuro: + right hand finger numbness. No dizziness, weakness, headaches.  Psych: No suicidal ideations.    All remaining systems reviewed and found to be negative, except as stated above.        Exam: /70 (BP Location: Left arm, Patient Position: Sitting, BP Cuff Size: Adult)   Pulse 82   Temp 36.7 °C (98 °F) (Temporal)   Resp 16   Ht 1.778 m (5' 10\")   Wt 98.9 kg (218 lb)   SpO2 94%  Body mass index is 31.28 kg/m².    General: Well nourished, well developed male in NAD, awake and conversant.  Eyes: Normal conjunctiva, anicteric.  Round symmetrical pupils.  ENT: Hearing grossly intact.  No nasal discharge.  Neck: Neck is supple.  No masses or thyromegaly.  CV: No lower extremity edema.  Respiratory: Respirations are nonlabored.  No wheezing.  Abdomen: Non-Distended.  Skin: Warm.  No rashes or ulcers.  MSK: Normal ambulation.  No clubbing or cyanosis.  Neuro: Sensation and CN II-XII grossly normal.  Psych: Alert and oriented.  Cooperative, appropriate mood and affect, normal judgment.      Assessment/Plan:  1. Establishing care with new doctor, encounter for    2. Dyslipidemia  New to examiner, chronic for patient. Does not take medication for this issue. 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  Due for updated labs prior to follow up.   - HEMOGLOBIN A1C; Future  - Comp Metabolic Panel; Future  - Lipid Profile; Future  - TSH WITH REFLEX TO FT4; Future    3. Class 1 obesity due to excess calories with serious comorbidity and body mass index (BMI) of 31.0 to 31.9 in adult  New to examiner, chronic for patient. Encourage healthy diet and regular diet. Due for updated labs prior to follow up.   - HEMOGLOBIN A1C; Future  - CBC WITH DIFFERENTIAL; Future  - Comp Metabolic Panel; Future  - Lipid Profile; Future  - TSH WITH REFLEX TO FT4; Future    4. Vitamin D deficiency  New to examiner, chronic for patient. Recommend OTC vitamin d and calcium supplement daily. Due for updated labs prior to follow up.   - VITAMIN D,25 HYDROXY (DEFICIENCY); Future    5. Finger numbness  New to examiner, chronic for patient. Recommend using right wrist/hand splint as often as possible, even when sleeping. Referral to neurodiagnostics for EMG and NCS to evaluate for carpal tunnel syndrome. Due for updated labs prior to follow up.   - CBC WITH DIFFERENTIAL; Future  - TSH WITH REFLEX TO FT4; Future  - Referral to Neurodiagnostics (EEG,EP,EMG/NCS/DBS)    6. Routine health maintenance  Due for updated labs prior to follow up.   - HEMOGLOBIN A1C; Future  - CBC WITH DIFFERENTIAL; Future  - Comp Metabolic Panel; Future  - HEP C VIRUS ANTIBODY; Future  - HIV AG/AB COMBO ASSAY SCREENING; Future  - Lipid Profile; Future  - MICROALBUMIN CREAT RATIO URINE; Future  - TSH WITH REFLEX TO FT4; Future  - VITAMIN D,25 HYDROXY (DEFICIENCY); Future    7. High risk for diabetes mellitus  Due for updated labs prior to follow up.   - HEMOGLOBIN A1C; Future  - Comp Metabolic Panel; Future  - Lipid Profile; Future  - MICROALBUMIN CREAT RATIO URINE; Future    8. Screening for HIV without presence of risk factors  Due for one time screening.  - HIV AG/AB COMBO ASSAY SCREENING; Future    9. Need for hepatitis  C screening test  Due for one time screening.  - HEP C VIRUS ANTIBODY; Future     Educated in proper administration of medication(s) ordered today including safety, possible SE, risks, benefits, rationale and alternatives to therapy.   Supportive care, differential diagnoses, and indications for immediate follow-up discussed with patient.    Pathogenesis of diagnosis discussed including typical length and natural progression.    Instructed to return to clinic or nearest emergency department for any change in condition, further concerns, or worsening of symptoms.  Patient states understanding of the plan of care and discharge instructions.    Return in about 6 weeks (around 10/31/2023) for Preventative Annual, Follow up Labs, As needed.    Please note that this dictation was created using voice recognition software. I have made every reasonable attempt to correct obvious errors, but I expect that there are errors of grammar and possibly content that I did not discover before finalizing the note.

## 2023-10-06 ENCOUNTER — NON-PROVIDER VISIT (OUTPATIENT)
Dept: NEUROLOGY | Facility: MEDICAL CENTER | Age: 48
End: 2023-10-06
Attending: SPECIALIST
Payer: COMMERCIAL

## 2023-10-06 DIAGNOSIS — R20.0 FINGER NUMBNESS: ICD-10-CM

## 2023-10-06 PROCEDURE — 95909 NRV CNDJ TST 5-6 STUDIES: CPT | Performed by: SPECIALIST

## 2023-10-06 PROCEDURE — 95886 MUSC TEST DONE W/N TEST COMP: CPT | Mod: 26 | Performed by: SPECIALIST

## 2023-10-06 PROCEDURE — 95909 NRV CNDJ TST 5-6 STUDIES: CPT | Mod: 26 | Performed by: SPECIALIST

## 2023-10-06 PROCEDURE — 95886 MUSC TEST DONE W/N TEST COMP: CPT | Performed by: SPECIALIST

## 2023-10-06 NOTE — PROCEDURES
NERVE CONDUCTION STUDIES AND ELECTROMYOGRAPHY REPORT        10/06/23      Referring provider: BARBIE Andrade      SUMMARY OF PATIENT'S CLINICAL HISTORY,PHYSICAL EXAM, AND RATIONALE FOR TESTING:    Mr. Kedar Michele III 48 y.o. presenting with right hand numbness and pain.    Past Medical History is significant for :   Past Medical History:   Diagnosis Date    Back pain 12/13/2019    Brain hemangioma (HCC)     COVID-19 6/10/2022    Hyperlipidemia     Lump in neck 8/17/2020    Migraine        The electrodiagnostic studies were performed to evaluate for possible peripheral neuropathy versus radiculopathy.      ELECTRODIAGNOSTIC EXAMINATION:  Nerve conduction studies (NCS) and electromyography (EMG) are utilized to evaluate direct or indirect damage to the peripheral nervous system. NCS are performed to measure the nerve(s) response(s) to electrostimulation across a given nerve segment. EMG evaluates the passive and active electrical activity of the muscle(s) in question.  Muscles are innervated by specific peripheral nerves and roots. Often times, several nerves the muscle to be examined in order to determine the presence or absence of the disease process. Furthermore, nerves and muscles may need to be tested in a [id number] comparison, as well as in additional extremities, as this may be crucial in characterizing the extent of the disease process, which may be diffuse or isolated and of varying degree of severity. The extent of the neurodiagnostic exam is justified as it may help arrive to a proper diagnosis, which ultimately may contribute to better management of the patient. Therefore, the nerves to muscles examined during the study were medically necessary.    Unless otherwise noted, temperature of the extremity(s) study was monitored before and during the examination and remained between 32 and 36 degrees C for the upper extremities, and between 30 and 36 degrees C for the lower  extremities.      NERVE CONDUCTION STUDIES:  Sensory nerves:   -Right median sensory nerve was examined.The response was within normal limits.  -Right ulnar sensory nerve was examined. The response was within normal limits.  -Right median/ulnar palmar comparison study was examined.  The response was within normal limits.    Motor nerves:   -Right median motor nerve was examined. Recording electrodes placed at the Abductor Pollicis Brevis muscles. The response was within normal limits.  -Right ulnar motor nerve was examined. Recording electrodes placed at the Abductor Digiti Minimi muscles. The response was within normal limits.    No temporal dispersion or conduction block observed in any of the motor nerves examined.          ELECTROMYOGRAPHY:  The study was performed the concentric needle electrode. Fibrillation and fasciculation activity is graded by convention from none (0) to continuous (4+).  Needle electrode examination was performed in the following muscles: Deltoid, biceps, triceps, first dorsal interosseous, abductor pollicis brevis.  The muscles tested demonstrated normal insertional activity, normal motor unit morphology and recruitment. There were no elements suggestive of active denervation.    Nerve Conduction Studies     Stim Site NR Onset (ms) Norm Onset (ms) O-P Amp (µV) Norm O-P Amp Site1 Site2 Delta-P (ms) Dist (cm) Armaan (m/s) Norm Armaan (m/s)   Right Median Anti Sensory (2nd Digit)   Wrist    2.5 <3.4 26.0 >20 Wrist 2nd Digit 3.2 14.0 *44 >50   Right Ulnar Anti Sensory (5th Digit)   Wrist    2.3 <3.1 13.7 >12 Wrist 5th Digit 3.4 14.0 *41 >50        Stim Site NR Onset (ms) Norm Onset (ms) O-P Amp (mV) Norm O-P Amp Site1 Site2 Delta-0 (ms) Dist (cm) Armaan (m/s) Norm Armaan (m/s)   Right Median Motor (Abd Poll Brev)   Wrist    3.7 <3.9 10.7 >6 Elbow Wrist 4.4 27.0 61 >50   Elbow    8.1  9.8          Right Ulnar Motor (Abd Dig Min)   Wrist    2.6 <3.1 9.9 >7 B Elbow Wrist 3.3 21.0 64 >50   B Elbow    5.9   8.8  A Elbow B Elbow 1.4 10.0 71    A Elbow    7.3  8.0               Stim Site NR Peak (ms) Norm Peak (ms) P-T Amp (µV) Site1 Site2 Delta-P (ms) Norm Delta (ms)   Right Median/Ulnar Palm Comparison (Wrist - 8cm)   Median Palm    1.9 <2.3 40.4 Median Palm Ulnar Palm 0.2 <0.3   Ulnar Palm    1.7 <2.3 12.5                      Electromyography     Side Muscle Nerve Root Ins Act Fibs Psw Amp Dur Poly Recrt Int Pat Comment   Right Deltoid Axillary C5-6 Nml Nml Nml Nml Nml 0 Nml Nml    Right Biceps Musculocut C5-6 Nml Nml Nml Nml Nml 0 Nml Nml    Right Triceps Radial C6-7-8 Nml Nml Nml Nml Nml 0 Nml Nml    Right 1stDorInt Ulnar C8-T1 Nml Nml Nml Nml Nml 0 Nml Nml    Right Abd Poll Brev Median C8-T1 Nml Nml Nml Nml Nml 0 Nml Nml              DIAGNOSTIC INTERPRETATION:   Extensive electrodiagnostic studies were performed to the upper extremity.  The results are as follows:    1.  Normal EMG and nerve conduction study right upper extremity.  Specifically right median and ulnar motor and sensory conduction studies were within normal limits, there were no acute or chronic denervation changes in selected muscles studied in the right upper extremity.  There was no evidence of right upper extremity peripheral neuropathy, plexopathy or radiculopathy.  Clinical correlation is suggested.        MOOKIE Earl M.D. (No note.)

## 2023-11-02 ENCOUNTER — HOSPITAL ENCOUNTER (OUTPATIENT)
Dept: LAB | Facility: MEDICAL CENTER | Age: 48
End: 2023-11-02
Attending: NURSE PRACTITIONER
Payer: COMMERCIAL

## 2023-11-02 DIAGNOSIS — E66.09 CLASS 1 OBESITY DUE TO EXCESS CALORIES WITH SERIOUS COMORBIDITY AND BODY MASS INDEX (BMI) OF 31.0 TO 31.9 IN ADULT: ICD-10-CM

## 2023-11-02 DIAGNOSIS — E55.9 VITAMIN D DEFICIENCY: ICD-10-CM

## 2023-11-02 DIAGNOSIS — Z91.89 HIGH RISK FOR DIABETES MELLITUS: ICD-10-CM

## 2023-11-02 DIAGNOSIS — E78.5 DYSLIPIDEMIA: ICD-10-CM

## 2023-11-02 DIAGNOSIS — Z00.00 ROUTINE HEALTH MAINTENANCE: ICD-10-CM

## 2023-11-02 DIAGNOSIS — Z11.4 SCREENING FOR HIV WITHOUT PRESENCE OF RISK FACTORS: ICD-10-CM

## 2023-11-02 DIAGNOSIS — R20.0 FINGER NUMBNESS: ICD-10-CM

## 2023-11-02 DIAGNOSIS — Z11.59 NEED FOR HEPATITIS C SCREENING TEST: ICD-10-CM

## 2023-11-02 LAB
25(OH)D3 SERPL-MCNC: 33 NG/ML (ref 30–100)
ALBUMIN SERPL BCP-MCNC: 4.8 G/DL (ref 3.2–4.9)
ALBUMIN/GLOB SERPL: 2 G/DL
ALP SERPL-CCNC: 83 U/L (ref 30–99)
ALT SERPL-CCNC: 30 U/L (ref 2–50)
ANION GAP SERPL CALC-SCNC: 13 MMOL/L (ref 7–16)
AST SERPL-CCNC: 24 U/L (ref 12–45)
BASOPHILS # BLD AUTO: 0.2 % (ref 0–1.8)
BASOPHILS # BLD: 0.02 K/UL (ref 0–0.12)
BILIRUB SERPL-MCNC: 0.8 MG/DL (ref 0.1–1.5)
BUN SERPL-MCNC: 12 MG/DL (ref 8–22)
CALCIUM ALBUM COR SERPL-MCNC: 8.9 MG/DL (ref 8.5–10.5)
CALCIUM SERPL-MCNC: 9.5 MG/DL (ref 8.5–10.5)
CHLORIDE SERPL-SCNC: 99 MMOL/L (ref 96–112)
CHOLEST SERPL-MCNC: 217 MG/DL (ref 100–199)
CO2 SERPL-SCNC: 24 MMOL/L (ref 20–33)
CREAT SERPL-MCNC: 0.93 MG/DL (ref 0.5–1.4)
CREAT UR-MCNC: 80.95 MG/DL
EOSINOPHIL # BLD AUTO: 0.11 K/UL (ref 0–0.51)
EOSINOPHIL NFR BLD: 1.1 % (ref 0–6.9)
ERYTHROCYTE [DISTWIDTH] IN BLOOD BY AUTOMATED COUNT: 39.4 FL (ref 35.9–50)
EST. AVERAGE GLUCOSE BLD GHB EST-MCNC: 100 MG/DL
GFR SERPLBLD CREATININE-BSD FMLA CKD-EPI: 102 ML/MIN/1.73 M 2
GLOBULIN SER CALC-MCNC: 2.4 G/DL (ref 1.9–3.5)
GLUCOSE SERPL-MCNC: 80 MG/DL (ref 65–99)
HBA1C MFR BLD: 5.1 % (ref 4–5.6)
HCT VFR BLD AUTO: 47 % (ref 42–52)
HCV AB SER QL: NORMAL
HDLC SERPL-MCNC: 49 MG/DL
HGB BLD-MCNC: 16.7 G/DL (ref 14–18)
HIV 1+2 AB+HIV1 P24 AG SERPL QL IA: NORMAL
IMM GRANULOCYTES # BLD AUTO: 0.03 K/UL (ref 0–0.11)
IMM GRANULOCYTES NFR BLD AUTO: 0.3 % (ref 0–0.9)
LDLC SERPL CALC-MCNC: 154 MG/DL
LYMPHOCYTES # BLD AUTO: 4.18 K/UL (ref 1–4.8)
LYMPHOCYTES NFR BLD: 40.4 % (ref 22–41)
MCH RBC QN AUTO: 30 PG (ref 27–33)
MCHC RBC AUTO-ENTMCNC: 35.5 G/DL (ref 32.3–36.5)
MCV RBC AUTO: 84.4 FL (ref 81.4–97.8)
MICROALBUMIN UR-MCNC: <1.2 MG/DL
MICROALBUMIN/CREAT UR: NORMAL MG/G (ref 0–30)
MONOCYTES # BLD AUTO: 0.68 K/UL (ref 0–0.85)
MONOCYTES NFR BLD AUTO: 6.6 % (ref 0–13.4)
NEUTROPHILS # BLD AUTO: 5.32 K/UL (ref 1.82–7.42)
NEUTROPHILS NFR BLD: 51.4 % (ref 44–72)
NRBC # BLD AUTO: 0 K/UL
NRBC BLD-RTO: 0 /100 WBC (ref 0–0.2)
PLATELET # BLD AUTO: 279 K/UL (ref 164–446)
PMV BLD AUTO: 10.1 FL (ref 9–12.9)
POTASSIUM SERPL-SCNC: 4.2 MMOL/L (ref 3.6–5.5)
PROT SERPL-MCNC: 7.2 G/DL (ref 6–8.2)
RBC # BLD AUTO: 5.57 M/UL (ref 4.7–6.1)
SODIUM SERPL-SCNC: 136 MMOL/L (ref 135–145)
TRIGL SERPL-MCNC: 69 MG/DL (ref 0–149)
TSH SERPL DL<=0.005 MIU/L-ACNC: 1.74 UIU/ML (ref 0.38–5.33)
WBC # BLD AUTO: 10.3 K/UL (ref 4.8–10.8)

## 2023-11-02 PROCEDURE — 86803 HEPATITIS C AB TEST: CPT

## 2023-11-02 PROCEDURE — 80053 COMPREHEN METABOLIC PANEL: CPT

## 2023-11-02 PROCEDURE — 85025 COMPLETE CBC W/AUTO DIFF WBC: CPT

## 2023-11-02 PROCEDURE — 87389 HIV-1 AG W/HIV-1&-2 AB AG IA: CPT

## 2023-11-02 PROCEDURE — 82306 VITAMIN D 25 HYDROXY: CPT

## 2023-11-02 PROCEDURE — 84443 ASSAY THYROID STIM HORMONE: CPT

## 2023-11-02 PROCEDURE — 36415 COLL VENOUS BLD VENIPUNCTURE: CPT

## 2023-11-02 PROCEDURE — 82570 ASSAY OF URINE CREATININE: CPT

## 2023-11-02 PROCEDURE — 80061 LIPID PANEL: CPT

## 2023-11-02 PROCEDURE — 82043 UR ALBUMIN QUANTITATIVE: CPT

## 2023-11-02 PROCEDURE — 83036 HEMOGLOBIN GLYCOSYLATED A1C: CPT

## 2025-01-25 SDOH — HEALTH STABILITY: PHYSICAL HEALTH: ON AVERAGE, HOW MANY MINUTES DO YOU ENGAGE IN EXERCISE AT THIS LEVEL?: 30 MIN

## 2025-01-25 SDOH — HEALTH STABILITY: PHYSICAL HEALTH: ON AVERAGE, HOW MANY DAYS PER WEEK DO YOU ENGAGE IN MODERATE TO STRENUOUS EXERCISE (LIKE A BRISK WALK)?: 2 DAYS

## 2025-01-25 SDOH — ECONOMIC STABILITY: INCOME INSECURITY: IN THE LAST 12 MONTHS, WAS THERE A TIME WHEN YOU WERE NOT ABLE TO PAY THE MORTGAGE OR RENT ON TIME?: YES

## 2025-01-25 SDOH — ECONOMIC STABILITY: HOUSING INSECURITY
IN THE LAST 12 MONTHS, WAS THERE A TIME WHEN YOU DID NOT HAVE A STEADY PLACE TO SLEEP OR SLEPT IN A SHELTER (INCLUDING NOW)?: YES

## 2025-01-25 SDOH — ECONOMIC STABILITY: FOOD INSECURITY: WITHIN THE PAST 12 MONTHS, THE FOOD YOU BOUGHT JUST DIDN'T LAST AND YOU DIDN'T HAVE MONEY TO GET MORE.: NEVER TRUE

## 2025-01-25 SDOH — ECONOMIC STABILITY: FOOD INSECURITY: WITHIN THE PAST 12 MONTHS, YOU WORRIED THAT YOUR FOOD WOULD RUN OUT BEFORE YOU GOT MONEY TO BUY MORE.: NEVER TRUE

## 2025-01-25 SDOH — ECONOMIC STABILITY: INCOME INSECURITY: HOW HARD IS IT FOR YOU TO PAY FOR THE VERY BASICS LIKE FOOD, HOUSING, MEDICAL CARE, AND HEATING?: NOT HARD AT ALL

## 2025-01-25 SDOH — HEALTH STABILITY: MENTAL HEALTH
STRESS IS WHEN SOMEONE FEELS TENSE, NERVOUS, ANXIOUS, OR CAN'T SLEEP AT NIGHT BECAUSE THEIR MIND IS TROUBLED. HOW STRESSED ARE YOU?: PATIENT DECLINED

## 2025-01-25 ASSESSMENT — SOCIAL DETERMINANTS OF HEALTH (SDOH)
HOW OFTEN DO YOU ATTEND CHURCH OR RELIGIOUS SERVICES?: PATIENT DECLINED
DO YOU BELONG TO ANY CLUBS OR ORGANIZATIONS SUCH AS CHURCH GROUPS UNIONS, FRATERNAL OR ATHLETIC GROUPS, OR SCHOOL GROUPS?: PATIENT DECLINED
WITHIN THE PAST 12 MONTHS, YOU WORRIED THAT YOUR FOOD WOULD RUN OUT BEFORE YOU GOT THE MONEY TO BUY MORE: NEVER TRUE
IN A TYPICAL WEEK, HOW MANY TIMES DO YOU TALK ON THE PHONE WITH FAMILY, FRIENDS, OR NEIGHBORS?: PATIENT DECLINED
HOW OFTEN DO YOU GET TOGETHER WITH FRIENDS OR RELATIVES?: PATIENT DECLINED
HOW OFTEN DO YOU ATTENT MEETINGS OF THE CLUB OR ORGANIZATION YOU BELONG TO?: PATIENT DECLINED
DO YOU BELONG TO ANY CLUBS OR ORGANIZATIONS SUCH AS CHURCH GROUPS UNIONS, FRATERNAL OR ATHLETIC GROUPS, OR SCHOOL GROUPS?: PATIENT DECLINED
HOW OFTEN DO YOU ATTENT MEETINGS OF THE CLUB OR ORGANIZATION YOU BELONG TO?: PATIENT DECLINED
HOW MANY DRINKS CONTAINING ALCOHOL DO YOU HAVE ON A TYPICAL DAY WHEN YOU ARE DRINKING: PATIENT DOES NOT DRINK
HOW OFTEN DO YOU HAVE A DRINK CONTAINING ALCOHOL: NEVER
IN THE PAST 12 MONTHS, HAS THE ELECTRIC, GAS, OIL, OR WATER COMPANY THREATENED TO SHUT OFF SERVICE IN YOUR HOME?: NO
HOW OFTEN DO YOU HAVE SIX OR MORE DRINKS ON ONE OCCASION: NEVER
HOW HARD IS IT FOR YOU TO PAY FOR THE VERY BASICS LIKE FOOD, HOUSING, MEDICAL CARE, AND HEATING?: NOT HARD AT ALL
IN A TYPICAL WEEK, HOW MANY TIMES DO YOU TALK ON THE PHONE WITH FAMILY, FRIENDS, OR NEIGHBORS?: PATIENT DECLINED
HOW OFTEN DO YOU GET TOGETHER WITH FRIENDS OR RELATIVES?: PATIENT DECLINED
HOW OFTEN DO YOU ATTEND CHURCH OR RELIGIOUS SERVICES?: PATIENT DECLINED

## 2025-01-25 ASSESSMENT — LIFESTYLE VARIABLES
AUDIT-C TOTAL SCORE: 0
HOW MANY STANDARD DRINKS CONTAINING ALCOHOL DO YOU HAVE ON A TYPICAL DAY: PATIENT DOES NOT DRINK
HOW OFTEN DO YOU HAVE A DRINK CONTAINING ALCOHOL: NEVER
SKIP TO QUESTIONS 9-10: 1
HOW OFTEN DO YOU HAVE SIX OR MORE DRINKS ON ONE OCCASION: NEVER

## 2025-01-27 ENCOUNTER — APPOINTMENT (OUTPATIENT)
Dept: MEDICAL GROUP | Facility: PHYSICIAN GROUP | Age: 50
End: 2025-01-27
Payer: COMMERCIAL

## 2025-01-27 VITALS
HEART RATE: 73 BPM | BODY MASS INDEX: 32.17 KG/M2 | WEIGHT: 224.7 LBS | OXYGEN SATURATION: 98 % | DIASTOLIC BLOOD PRESSURE: 80 MMHG | TEMPERATURE: 97.4 F | HEIGHT: 70 IN | SYSTOLIC BLOOD PRESSURE: 132 MMHG

## 2025-01-27 DIAGNOSIS — Z00.00 ENCOUNTER FOR WELL ADULT EXAM WITHOUT ABNORMAL FINDINGS: ICD-10-CM

## 2025-01-27 DIAGNOSIS — Z80.42 FAMILY HISTORY OF PROSTATE CANCER IN FATHER: ICD-10-CM

## 2025-01-27 DIAGNOSIS — E66.811 CLASS 1 OBESITY DUE TO EXCESS CALORIES WITH SERIOUS COMORBIDITY AND BODY MASS INDEX (BMI) OF 32.0 TO 32.9 IN ADULT: ICD-10-CM

## 2025-01-27 DIAGNOSIS — E66.09 CLASS 1 OBESITY DUE TO EXCESS CALORIES WITH SERIOUS COMORBIDITY AND BODY MASS INDEX (BMI) OF 32.0 TO 32.9 IN ADULT: ICD-10-CM

## 2025-01-27 DIAGNOSIS — Z12.5 SCREENING FOR MALIGNANT NEOPLASM OF PROSTATE: ICD-10-CM

## 2025-01-27 PROCEDURE — 3075F SYST BP GE 130 - 139MM HG: CPT | Performed by: NURSE PRACTITIONER

## 2025-01-27 PROCEDURE — 3079F DIAST BP 80-89 MM HG: CPT | Performed by: NURSE PRACTITIONER

## 2025-01-27 PROCEDURE — 99396 PREV VISIT EST AGE 40-64: CPT | Performed by: NURSE PRACTITIONER

## 2025-01-27 ASSESSMENT — FIBROSIS 4 INDEX: FIB4 SCORE: 0.75

## 2025-01-27 ASSESSMENT — PATIENT HEALTH QUESTIONNAIRE - PHQ9: CLINICAL INTERPRETATION OF PHQ2 SCORE: 0

## 2025-01-27 NOTE — PROGRESS NOTES
Subjective:   CC:   Chief Complaint   Patient presents with    Annual Exam     Letter for work     Requesting Labs     Verbal consent was acquired by the patient to use [employer] ambient listening note generation during this visit Yes   HPI:   Kedar Michele III is a 49 y.o. male who presents for annual exam:    History of Present Illness  The patient presents for a routine health maintenance visit.    He reports no new medical or surgical history. He is not currently on any medications. He has not received the influenza vaccine and is uncertain about his hepatitis B vaccination status. His last tetanus vaccine was administered in 1995 by his mother, a public health nurse, due to his occupational requirements. He is sexually active with a female partner and has undergone vasectomy. He practices safe sex. He is not interested in STD testing. He undergoes annual laboratory tests through his workplace every summer, which he will forward to us. He acknowledges that his diet and exercise regimen could be improved. He consistently wears a seatbelt while driving and feels secure in his relationship. He admits to inadequate sun protection, typically wearing a hat and long sleeves, but not using sunscreen. He is up-to-date with his dental and ophthalmological check-ups, having visited both providers last month. He underwent colonoscopy on 3/20/2023, which revealed some polyps of concern, necessitating a repeat procedure in 3 years. He reports no ear pain, difficulty swallowing, chest pain, shortness of breath, constipation, diarrhea, hematuria, or hematochezia. He also reports no urinary issues.    SOCIAL HISTORY  He does not smoke, vape, or use drugs. He consumes alcohol very infrequently, approximately once every 5 years.    FAMILY HISTORY  His parents are alive. He has 1 sister. He has 1 maternal uncle who is alive. He has 2 paternal aunts. He has 4 paternal uncles, 1 of whom passed away a couple of years ago.  His son is alive. His father had prostate cancer.    IMMUNIZATIONS  He is unsure about his hepatitis B vaccination status. He received a tetanus vaccine in 1995.     Anticipatory Guidance:  Cholesterol screening: July 2024 - patient will send results  Diet: Recommend more lean meats, fruits, vegetables, whole grains.   Exercise: Encourage regular exercise.   Substance abuse: No   Safe in relationship: Yes  Seatbelts, bike/motorcycle helmet: Yes  Sun protection: Recommended  Dentist: Up to date   Eye doctor: Up to date     Cancer Screening:  Colorectal cancer screening: 3/20/2023, 3 year recall    Infectious Disease Screening/Immunizations:  STI screening: Declines  Chlamydia/Gonorrhea screening: Declines  Hep C screening: Complete  HIV screen: Complete  Practices safe sex: Yes    Immunizations:   Influenza: Declines   Tetanus: Declines   Hep B: Recommended   Pneumonia: NA due at age 65   Shingrix: NA due at age 50    RSV: NA due at age 75   Received HPV series: Aged out    Preventative Care Screening:   Osteoporosis Screening: NA  Tobacco Screening: Never smoker   AAA Screening: NA    He  reports being sexually active and has had partner(s) who are female. He reports using the following method of birth control/protection: Male Sterilization.  He  has a past medical history of Back pain (12/13/2019), Brain hemangioma (HCC), COVID-19 (6/10/2022), Hyperlipidemia, Lump in neck (8/17/2020), and Migraine.  He  has a past surgical history that includes vasectomy.    Family History   Problem Relation Age of Onset    Multiple Sclerosis Mother     Cancer Father         Prostate    Prostate cancer Father     No Known Problems Sister     No Known Problems Maternal Uncle     No Known Problems Paternal Aunt     No Known Problems Paternal Aunt     Lung Disease Paternal Uncle     Cancer Paternal Uncle     Lung Cancer Paternal Uncle     Bone Cancer Paternal Uncle     No Known Problems Paternal Uncle     No Known Problems Paternal  "Uncle     No Known Problems Paternal Uncle     No Known Problems Maternal Grandmother     No Known Problems Maternal Grandfather     No Known Problems Paternal Grandmother     Lung Disease Paternal Grandfather     No Known Problems Son      Social History     Tobacco Use    Smoking status: Never     Passive exposure: Past    Smokeless tobacco: Never   Vaping Use    Vaping status: Never Used   Substance Use Topics    Alcohol use: Not Currently    Drug use: Never     Patient Active Problem List    Diagnosis Date Noted    Dyslipidemia 09/19/2023    Class 1 obesity due to excess calories with serious comorbidity and body mass index (BMI) of 32.0 to 32.9 in adult 09/19/2023    Vitamin D deficiency 09/19/2023    Finger numbness 09/19/2023    Migraine with aura and without status migrainosus, not intractable 08/23/2022    Other fatigue 06/10/2022     No current outpatient medications on file.     No current facility-administered medications for this visit.     Allergies   Allergen Reactions    Pcn [Penicillins] Hives and Swelling     Review of Systems   Constitutional: Negative for fever, chills and malaise/fatigue.   HENT: Negative for congestion.    Eyes: Negative for pain.   Respiratory: Negative for cough and shortness of breath.    Cardiovascular: Negative for chest pain and leg swelling.   Gastrointestinal: Negative for nausea, vomiting, abdominal pain and diarrhea.   Genitourinary: Negative for dysuria and hematuria.   Skin: Negative for rash.   Neurological: Negative for dizziness, focal weakness and headaches.   Endo/Heme/Allergies: Does not bruise/bleed easily.   Psychiatric/Behavioral: Negative for depression.  The patient is not nervous/anxious.      Objective:   /80 (BP Location: Right arm, Patient Position: Sitting, BP Cuff Size: Adult)   Pulse 73   Temp 36.3 °C (97.4 °F) (Temporal)   Ht 1.778 m (5' 10\")   Wt 102 kg (224 lb 11.2 oz)   SpO2 98%   BMI 32.24 kg/m²     Wt Readings from Last 4 " Encounters:   01/27/25 102 kg (224 lb 11.2 oz)   09/19/23 98.9 kg (218 lb)   01/09/23 91.6 kg (202 lb)   08/23/22 93 kg (205 lb)     Physical Exam:  Constitutional: Well-developed and well-nourished. Not diaphoretic. No distress.   Skin: Skin is warm and dry. No rash noted.  Head: Atraumatic without lesions.  Eyes: Conjunctivae and extraocular motions are normal. Pupils are equal, round, and reactive to light. No scleral icterus.   Ears:  External ears unremarkable. Tympanic membranes clear and intact.  Nose: Nares patent. Septum midline. Turbinates without erythema nor edema. No discharge.   Mouth/Throat: Tongue normal. Oropharynx is clear and moist. Posterior pharynx without erythema or exudates.  Neck: Supple, trachea midline. Normal range of motion. No thyromegaly present. No lymphadenopathy--cervical or supraclavicular.  Cardiovascular: Regular rate and rhythm, S1 and S2 without murmur, rubs, or gallops.    Respiratory: Effort normal. Clear to auscultation throughout. No adventitious sounds.   Abdomen: Soft, non tender, and without distention. Active bowel sounds in all four quadrants. No rebound, guarding.  Extremities: No cyanosis, clubbing, erythema, nor edema. Radial pulses intact and symmetric.   Musculoskeletal: All major joints AROM full in all directions without pain.  Neurological: Alert and oriented x 3. Grossly non-focal. Strength and sensation grossly intact.   Psychiatric:  Behavior, mood, and affect are appropriate.    Assessment and Plan:   1. Encounter for well adult exam without abnormal findings  He has previously undergone screening for hepatitis C and HIV. He has expressed a preference against receiving the influenza and tetanus vaccines. His last tetanus vaccine was administered in 1995 by his mother, a public health nurse, due to his occupational requirements. He is uncertain about his hepatitis B vaccination status and will provide records if available. Pneumonia vaccine is due at age 65,  shingles vaccine at age 51, and RSV vaccine at age 76. He is a non-smoker, which is beneficial for his health. A PSA test will be ordered today. He will provide his vaccination records if they can be located. He will also forward his lab results from his workplace.    2. Class 1 obesity due to excess calories with serious comorbidity and body mass index (BMI) of 32.0 to 32.9 in adult  Chronic, ongoing.  Encourage diet high in fruits, vegetables, and fiber. And a diet low in salt, refined carbohydrates, cholesterol, saturated fat, and trans fatty acids.    Encourage a minimum of 30 minutes of moderate intensity aerobic exercise (eg, brisk walking) is recommended on five days each week. Or 30 minutes of vigorous-intensity aerobic exercise (eg, jogging) on three days each week.   Patient's body mass index is 32.24 kg/m². Exercise and nutrition counseling were performed at this visit.  - Patient identified as having weight management issue.  Appropriate orders and counseling given.    3. Screening for malignant neoplasm of prostate  4. Family history of prostate cancer in father  - PROSTATE SPECIFIC AG SCREENING; Future     Health maintenance: Up to date   Labs per orders  Immunizations: Declines   Patient counseled about skin care, diet, supplements, and exercise.  Discussed  STD prevention, family planning choices, menopause, osteoporosis, adequate intake of calcium and vitamin D, diet and exercise, colorectal cancer screening.     Follow-up: Return in about 1 year (around 1/27/2026) for Preventative Annual.     Please note that this dictation was created using voice recognition software. I have worked with consultants from the vendor as well as technical experts from [employer] to optimize the interface. I have made every reasonable attempt to correct obvious errors, but I expect that there are errors of grammar and possibly content that I did not discover before finalizing the note.

## 2025-02-07 ENCOUNTER — HOSPITAL ENCOUNTER (OUTPATIENT)
Dept: LAB | Facility: MEDICAL CENTER | Age: 50
End: 2025-02-07
Attending: NURSE PRACTITIONER
Payer: COMMERCIAL

## 2025-02-07 DIAGNOSIS — Z80.42 FAMILY HISTORY OF PROSTATE CANCER IN FATHER: ICD-10-CM

## 2025-02-07 DIAGNOSIS — Z12.5 SCREENING FOR MALIGNANT NEOPLASM OF PROSTATE: ICD-10-CM

## 2025-02-07 LAB — PSA SERPL DL<=0.01 NG/ML-MCNC: 0.91 NG/ML (ref 0–4)

## 2025-02-07 PROCEDURE — 84153 ASSAY OF PSA TOTAL: CPT

## 2025-02-07 PROCEDURE — 36415 COLL VENOUS BLD VENIPUNCTURE: CPT
